# Patient Record
Sex: FEMALE | Race: WHITE | NOT HISPANIC OR LATINO | Employment: FULL TIME | URBAN - NONMETROPOLITAN AREA
[De-identification: names, ages, dates, MRNs, and addresses within clinical notes are randomized per-mention and may not be internally consistent; named-entity substitution may affect disease eponyms.]

---

## 2017-01-05 ENCOUNTER — OFFICE VISIT (OUTPATIENT)
Dept: BARIATRICS/WEIGHT MGMT | Facility: CLINIC | Age: 47
End: 2017-01-05

## 2017-01-05 ENCOUNTER — NUTRITION (OUTPATIENT)
Dept: BARIATRICS/WEIGHT MGMT | Facility: HOSPITAL | Age: 47
End: 2017-01-05

## 2017-01-05 ENCOUNTER — APPOINTMENT (OUTPATIENT)
Dept: LAB | Facility: HOSPITAL | Age: 47
End: 2017-01-05

## 2017-01-05 VITALS
RESPIRATION RATE: 20 BRPM | DIASTOLIC BLOOD PRESSURE: 87 MMHG | SYSTOLIC BLOOD PRESSURE: 132 MMHG | WEIGHT: 205 LBS | HEART RATE: 100 BPM | BODY MASS INDEX: 36.32 KG/M2 | TEMPERATURE: 98 F | HEIGHT: 63 IN

## 2017-01-05 DIAGNOSIS — E66.01 MORBID OBESITY DUE TO EXCESS CALORIES (HCC): Primary | ICD-10-CM

## 2017-01-05 DIAGNOSIS — E55.9 VITAMIN D DEFICIENCY: ICD-10-CM

## 2017-01-05 LAB — 25(OH)D3 SERPL-MCNC: 17.3 NG/ML (ref 30–100)

## 2017-01-05 PROCEDURE — 36415 COLL VENOUS BLD VENIPUNCTURE: CPT | Performed by: NURSE PRACTITIONER

## 2017-01-05 PROCEDURE — 97803 MED NUTRITION INDIV SUBSEQ: CPT

## 2017-01-05 PROCEDURE — 99213 OFFICE O/P EST LOW 20 MIN: CPT | Performed by: NURSE PRACTITIONER

## 2017-01-05 PROCEDURE — 82306 VITAMIN D 25 HYDROXY: CPT | Performed by: NURSE PRACTITIONER

## 2017-01-05 NOTE — MR AVS SNAPSHOT
Alona M Roman   1/5/2017 4:00 PM   Office Visit    Dept Phone:  [phone]   Encounter #:  [id number]    Provider:  DELON Burgess   Department:  [facility] GENERAL SURGERY                Your Full Care Plan              Your Updated Medication List          This list is accurate as of: 1/5/17  3:57 PM.  Always use your most recent med list.                hydrochlorothiazide 25 MG tablet   Commonly known as:  HYDRODIURIL       lisinopril 10 MG tablet   Commonly known as:  PRINIVIL,ZESTRIL       omeprazole 20 MG capsule   Commonly known as:  priLOSEC       potassium chloride 10 MEQ CR capsule   Commonly known as:  MICRO-K       vitamin D 00243 UNITS capsule capsule   Commonly known as:  ERGOCALCIFEROL               We Performed the Following     Vitamin D 25 Hydroxy       You Were Diagnosed With        Codes Comments    Morbid obesity due to excess calories    -  Primary ICD-10-CM: E66.01  ICD-9-CM: 278.01     Body mass index 36.0-36.9, adult     ICD-10-CM: Z68.36  ICD-9-CM: V85.36     Vitamin D deficiency     ICD-10-CM: E55.9  ICD-9-CM: 268.9       Instructions    Alona MUNOZ Roman has done well this month with healthy changes. Patient's weight has not changed much.Today we discussed healthy changes in lifestyle, diet, and exercise.   They will meet with the dietician today.   Intensive behavioral therapy for obesity was done today.   Goals for this month are: continue with good diet and exercise choices.  Just finished Vitamin D script; will check level today before prescribing more.   Insurance requires until May of 2017 before submitting for surgery.   Follow up in one month for a weight recheck.        Patient Instructions History      Upcoming Appointments     Visit Type Date Time Department    OFFICE VISIT 1/5/2017  4:00 PM [facility] BAR SURG [facility]    NUTRITION COUNSELING 1/5/2017  4:00 PM  [facility] BARIATRIC    OFFICE VISIT 2/2/2017  9:00 AM [facility]    OFFICE  VISIT 3/2/2017  9:00 AM [facility]    OFFICE VISIT 2017  9:00 AM [facility]      MyChart Signup     [facility] [employer] allows you to send messages to your doctor, view your test results, renew your prescriptions, schedule appointments, and more. To sign up, go to [facility] and click on the Sign Up Now link in the New User? box. Enter your Acupera Activation Code exactly as it appears below along with the last four digits of your Social Security Number and your Date of Birth () to complete the sign-up process. If you do not sign up before the expiration date, you must request a new code.    Acupera Activation Code: [email or web address]  Expires: 2017  3:57 PM    If you have questions, you can email [email or web address] or call [phone] to talk to our Acuper[employer] staff. Remember, Acupera is NOT to be used for urgent needs. For medical emergencies, dial 911.               Other Info from Your Visit           Your Appointments     2017  4:00 PM CST   Office Visit with Farnaz Hahn APRZUHAIR   [facility] GENERAL SURGERY (--)    [address] [place] [address] [address]  [place] [address]   [phone]           Arrive 15 minutes prior to appointment.            2017  4:00 PM CST   Nutrition Counseling with [facility] BARIATRIC RESOURCE   [facility] BARIATRIC ([place])    [address] [facility] [address] [place] [address]   [phone]            2017  9:00 AM CST   Office Visit with DELNO Burgess   [facility] GENERAL SURGERY (--)    [address]  [place] [address]   [phone]           Arrive 15 minutes prior to appointment.            Mar 02, 2017  9:00 AM CST   Office Visit with DELON Burgess   [facility] GENERAL SURGERY (--)    [address]  [place] [address]   [phone]           Arrive 15 minutes prior to appointment.            2017  9:00 AM CDT  "  Office Visit with DELON Burgess   [facility] GENERAL SURGERY (--)    [address] [place] [address] [phone]           Arrive 15 minutes prior to appointment.              Allergies     No Known Allergies      Reason for Visit     Weight Loss Ba 10 Visit     Obesity           Vital Signs     Blood Pressure Pulse Temperature Respirations Height Weight    132/87 (BP Location: Right arm, Patient Position: Sitting, Cuff Size: Adult) 100 98 °F (36.7 °C) 20 63\" (160 cm) 205 lb (93 kg)    Body Mass Index Smoking Status                36.31 kg/m2 Never Smoker          Problems and Diagnoses Noted     Body mass index 36.0-36.9, adult    Severe obesity    Vitamin D deficiency        "

## 2017-01-05 NOTE — PATIENT INSTRUCTIONS
Alona Owens has done well this month with healthy changes. Patient's weight has not changed much.Today we discussed healthy changes in lifestyle, diet, and exercise.   They will meet with the dietician today.   Intensive behavioral therapy for obesity was done today.   Goals for this month are: continue with good diet and exercise choices.  Just finished Vitamin D script; will check level today before prescribing more.   Insurance requires until May of 2017 before submitting for surgery.   Follow up in one month for a weight recheck.

## 2017-01-05 NOTE — PROGRESS NOTES
Subjective   Alona Owens is a 46 y.o. female.     History of Present Illness Alona Owens is here with morbid obesity and desires to have a laparoscopic sleeve gastrectomy for weight loss. This is the patient's 9th visit to the office. Patient has been cleared by psychologist. Patient has had EGD and was negative. She has been cleared by Dr. Sanchez. However, her insurance company is requiring her to wait until May of 2017 to be submitted for surgery clearance. Patient has been making health dietary choices and eating 5 small meals per day with protein at each. She has been taking Vitamin D 50,000 units once weekly. She recently ran out and is due for her level to be checked today.        The following portions of the patient's history were reviewed and updated as appropriate: allergies, current medications, past family history, past medical history, past social history, past surgical history and problem list.    Review of Systems   Constitutional: Negative for activity change, appetite change and fatigue.   HENT: Negative.    Eyes: Negative.    Respiratory: Negative.  Negative for apnea, cough, chest tightness and shortness of breath.    Cardiovascular: Negative.  Negative for chest pain, palpitations and leg swelling.   Gastrointestinal: Negative.  Negative for abdominal pain, anal bleeding, constipation, nausea and vomiting.   Endocrine: Negative.    Genitourinary: Negative.    Musculoskeletal: Negative.  Negative for arthralgias and back pain.   Skin: Negative.    Allergic/Immunologic: Negative.    Neurological: Negative.  Negative for seizures and syncope.   Hematological: Negative.  Does not bruise/bleed easily.   Psychiatric/Behavioral: Negative.  Negative for dysphoric mood, self-injury and sleep disturbance.       Objective   Physical Exam   Constitutional: She is oriented to person, place, and time. Vital signs are normal. She appears well-developed and well-nourished. She is cooperative. No distress.    HENT:   Head: Normocephalic and atraumatic.   Nose: Nose normal.   Mouth/Throat: Oropharynx is clear and moist. No oropharyngeal exudate or tonsillar abscesses.   Eyes: Conjunctivae, EOM and lids are normal. Pupils are equal, round, and reactive to light. Right eye exhibits no discharge. Left eye exhibits no discharge.   Neck: Trachea normal. Neck supple. No JVD present. Carotid bruit is not present. No rigidity. No tracheal deviation present. No thyromegaly present.   Cardiovascular: Normal rate, regular rhythm, S1 normal, S2 normal and normal heart sounds.    Pulmonary/Chest: Effort normal and breath sounds normal. No stridor. No respiratory distress. She has no wheezes. She has no rales.   Abdominal: Soft. Bowel sounds are normal. She exhibits no distension. There is no tenderness.   obese   Musculoskeletal: She exhibits no edema.        Right shoulder: She exhibits normal strength.   Lymphadenopathy:     She has no cervical adenopathy.   Neurological: She is alert and oriented to person, place, and time. She has normal strength. No cranial nerve deficit.   Skin: Skin is warm, dry and intact. No rash noted.   Psychiatric: She has a normal mood and affect. Her speech is normal and behavior is normal.   Alert and oriented x 3   Vitals reviewed.      Assessment/Plan   Alona was seen today for weight loss and obesity.    Diagnoses and all orders for this visit:    Morbid obesity due to excess calories  -     Vitamin D 25 Hydroxy    Body mass index 36.0-36.9, adult  -     Vitamin D 25 Hydroxy    Vitamin D deficiency  -     Vitamin D 25 Hydroxy              Alona ALEXANDER Roman has done well this month with healthy changes. Patient's weight has not changed much.Today we discussed healthy changes in lifestyle, diet, and exercise.   They will meet with the dietician today.   Intensive behavioral therapy for obesity was done today.   Goals for this month are: continue with good diet and exercise choices.  Just finished  Vitamin D script; will check level today before prescribing more.   Insurance requires until May of 2017 before submitting for surgery.   Follow up in one month for a weight recheck.

## 2017-01-05 NOTE — PROGRESS NOTES
"Nutrition Bariatric/M[facility] Note     Visit   BA# 7+     Anthropometrics   Height: 63\"  Weight: 205#  BMI: 36  No weight change    Nutrition Recall  24 Hour recall:  (B) protein shake (L) meat, salad (D) meat, vegetables  (hs) beef jerky or nuts  Eating 4 meals daily   Making healthier choices  Limited sweet intake  Monitoring portions  Drinking between meals   Drinking greater to or equal to 64 fluid ounces  Replacing 1 meal with protein shake daily     Exercise   Walkin times per week    Habits:  None    Education    Reinforce Nutritional Needs for Surgery    Nutrition Goals   Continue diet changes    Exercise Goals  Continue current exercise routine   Increase to 15-30 minutes of walking, cycling, elliptical, swimming, chair, yoga or crossfit every day.    Coreen Wall RD, LD  2017  3:48 PM    "

## 2017-01-05 NOTE — MR AVS SNAPSHOT
Alona ALEXANDER Roman   2017 4:00 PM   Nutrition    Dept Phone:  [phone]   Encounter #:  [id number]    Provider:  [facility] BARIATRIC RESOURCE   Department:  [facility] BARIATRIC                Your Full Care Plan              Your Updated Medication List          This list is accurate as of: 17  3:57 PM.  Always use your most recent med list.                hydrochlorothiazide 25 MG tablet   Commonly known as:  HYDRODIURIL       lisinopril 10 MG tablet   Commonly known as:  PRINIVIL,ZESTRIL       omeprazole 20 MG capsule   Commonly known as:  priLOSEC       potassium chloride 10 MEQ CR capsule   Commonly known as:  MICRO-K       vitamin D 37059 UNITS capsule capsule   Commonly known as:  ERGOCALCIFEROL               Instructions     None    Patient Instructions History      Upcoming Appointments     Visit Type Date Time Department    OFFICE VISIT 2017  4:00 PM [facility] BAR SURG P[facility]    NUTRITION COUNSELING 2017  4:00 PM  [facility] BARIATRIC    OFFICE VISIT 2017  9:00 AM [facility]    OFFICE VISIT 3/2/2017  9:00 AM [facility]G [facility]    OFFICE VISIT 2017  9:00 AM [facility]G [facility]      MyChart Signup     [facility] Cell[employer] allows you to send messages to your doctor, view your test results, renew your prescriptions, schedule appointments, and more. To sign up, go to [employer] and click on the Sign Up Now link in the New User? box. Enter your Cell[employer] Activation Code exactly as it appears below along with the last four digits of your Social Security Number and your Date of Birth () to complete the sign-up process. If you do not sign up before the expiration date, you must request a new code.    Cell[employer]ore Activation Code: [email or web address]  Expires: 2017  3:57 PM    If you have questions, you can email [email or web address] or call [phone] to talk to our Cellmem[employer] staff. Remember, Cell[employer]ore is NOT to be used for urgent needs. For medical  emergencies, dial 911.               Other Info from Your Visit           Your Appointments     Jan 05, 2017  4:00 PM CST   Office Visit with DELON Burgess   [facility] GENERAL SURGERY (--)    [address] [place] [address] [address]  [place] [address]   [phone]           Arrive 15 minutes prior to appointment.            Jan 05, 2017  4:00 PM CST   Nutrition Counseling with hospitals BARIATRIC RESOURCE   [facility] BARIATRIC ([place])    [address] [place] [address] [place] [id number] [phone]            Feb 02, 2017  9:00 AM CST   Office Visit with DELON Burgess   [facility] GENERAL SURGERY (--)    [address] [place] [address] [address]  [facility] [address]   [phone]           Arrive 15 minutes prior to appointment.            Mar 02, 2017  9:00 AM CST   Office Visit with DELON Burgess   [facility] GENERAL SURGERY (--)    [address] [place] [address] [address]  [facility] [address]   [phone]           Arrive 15 minutes prior to appointment.            Apr 06, 2017  9:00 AM CDT   Office Visit with DELON Burgess   [facility] GENERAL SURGERY (--)    [address] [place] [address] [address]  [place] [address]   [phone]           Arrive 15 minutes prior to appointment.              Allergies     No Known Allergies      Vital Signs     Smoking Status                   Never Smoker

## 2017-01-06 DIAGNOSIS — E55.9 VITAMIN D DEFICIENCY: Primary | ICD-10-CM

## 2017-01-06 RX ORDER — ERGOCALCIFEROL 1.25 MG/1
50000 CAPSULE ORAL 2 TIMES WEEKLY
Qty: 8 CAPSULE | Refills: 3 | Status: SHIPPED | OUTPATIENT
Start: 2017-01-09 | End: 2017-06-09 | Stop reason: HOSPADM

## 2017-01-09 ENCOUNTER — TELEPHONE (OUTPATIENT)
Dept: BARIATRICS/WEIGHT MGMT | Facility: HOSPITAL | Age: 47
End: 2017-01-09

## 2017-01-09 NOTE — TELEPHONE ENCOUNTER
----- Message from DELON Burgess sent at 1/6/2017  7:53 AM CST -----  Her Vitamin D level is actually lower now than before. I have sent script in for her to take Vitamin D 50,000 units TWICE weekly for next 3 months and then we will recheck level. Please, call and let her know. Thanks!      Called left a message for patient to return a call to our office.     Patient notified of her labs & prescription sent in to the pharmacy

## 2017-01-27 ENCOUNTER — RESULTS ENCOUNTER (OUTPATIENT)
Dept: BARIATRICS/WEIGHT MGMT | Facility: CLINIC | Age: 47
End: 2017-01-27

## 2017-01-27 DIAGNOSIS — E66.01 MORBID OBESITY DUE TO EXCESS CALORIES (HCC): ICD-10-CM

## 2017-02-02 ENCOUNTER — OFFICE VISIT (OUTPATIENT)
Dept: BARIATRICS/WEIGHT MGMT | Facility: CLINIC | Age: 47
End: 2017-02-02

## 2017-02-02 ENCOUNTER — NUTRITION (OUTPATIENT)
Dept: BARIATRICS/WEIGHT MGMT | Facility: HOSPITAL | Age: 47
End: 2017-02-02

## 2017-02-02 VITALS
OXYGEN SATURATION: 98 % | DIASTOLIC BLOOD PRESSURE: 91 MMHG | TEMPERATURE: 97.9 F | RESPIRATION RATE: 18 BRPM | WEIGHT: 206.2 LBS | SYSTOLIC BLOOD PRESSURE: 140 MMHG | BODY MASS INDEX: 36.54 KG/M2 | HEART RATE: 96 BPM | HEIGHT: 63 IN

## 2017-02-02 DIAGNOSIS — E66.01 MORBID OBESITY DUE TO EXCESS CALORIES (HCC): Primary | ICD-10-CM

## 2017-02-02 DIAGNOSIS — E55.9 VITAMIN D DEFICIENCY: ICD-10-CM

## 2017-02-02 DIAGNOSIS — Z01.818 PRE-OP EXAM: ICD-10-CM

## 2017-02-02 PROCEDURE — 97803 MED NUTRITION INDIV SUBSEQ: CPT

## 2017-02-02 PROCEDURE — 99213 OFFICE O/P EST LOW 20 MIN: CPT | Performed by: NURSE PRACTITIONER

## 2017-02-02 NOTE — PROGRESS NOTES
"Nutrition Bariatric/MWL Note     Visit   BA#  10    Anthropometrics   Height: 63\"  Weight: 206#  BMI: 36.5  No weight change    Nutrition Recall  24 Hour recall:  (B) protein shake (am) fruit (L) salad w/ham, turkey or salami (D) meat, vegetables (hs) protein shake or raw vegetables  Eating 5 meals daily   Protein lacking a tmid-morning and evening meal  Meeting protein daily goal  Making healthier choices  Limited sweet intake  Monitoring portions   Drinking between meals   Drinking greater to or equal to 64 fluid ounces  Replacing 1-2 meal with protein shake daily     Exercise   Walking on treadmill: 20 minutes every day    Habits:  None    Education    Reinforce Nutritional Needs for Surgery    Nutrition Goals   Continue diet changes  Include protein at all 5 meals  Eat protein first at meals    Exercise Goals  Continue current exercise routine   Increase to 30 minutes of walking, cycling, elliptical, swimming, chair, yoga or crossfit daily    Coreen Wall RD, LD  02/02/2017  9:17 AM    "

## 2017-02-02 NOTE — PROGRESS NOTES
Subjective   Alona Owens is a 46 y.o. female.     History of Present Illness Alona Owens is here with morbid obesity and desires to have a laparoscopic sleeve gastrectomy for weight loss. This is the patient's 10th visit to the office. Patient has been cleared by psychologist. Patient has had EGD and was negative. She has been cleared by Dr. Sanchez. However, her insurance company is requiring her to wait until May of 2017 to be submitted for surgery clearance. Patient has been making health dietary choices and eating 5 small meals per day with protein at each. She has been taking Vitamin D 50,000 units twice weekly due to level being low at last visit. Will recheck that level in April. She has been walking 20 minutes on the treadmill every day. She is drinking only water.     The following portions of the patient's history were reviewed and updated as appropriate: allergies, current medications, past family history, past medical history, past social history, past surgical history and problem list.    Review of Systems   Constitutional: Negative for activity change, appetite change and fatigue.   HENT: Positive for sinus pressure.    Eyes: Negative.    Respiratory: Negative.  Negative for apnea, cough, chest tightness and shortness of breath.    Cardiovascular: Negative.  Negative for chest pain, palpitations and leg swelling.   Gastrointestinal: Negative.  Negative for abdominal pain, anal bleeding, constipation, nausea and vomiting.   Endocrine: Negative.    Genitourinary: Negative.    Musculoskeletal: Negative.  Negative for arthralgias and back pain.   Skin: Negative.    Allergic/Immunologic: Negative.    Neurological: Positive for headaches. Negative for seizures and syncope.   Hematological: Negative.  Does not bruise/bleed easily.   Psychiatric/Behavioral: Negative.  Negative for dysphoric mood, self-injury and sleep disturbance.       Objective   Physical Exam   Constitutional: She is oriented to  person, place, and time. Vital signs are normal. She appears well-developed and well-nourished. She is cooperative. No distress.   HENT:   Head: Normocephalic and atraumatic.   Nose: Nose normal.   Mouth/Throat: Oropharynx is clear and moist. No oropharyngeal exudate or tonsillar abscesses.   Eyes: Conjunctivae, EOM and lids are normal. Pupils are equal, round, and reactive to light. Right eye exhibits no discharge. Left eye exhibits no discharge.   Neck: Trachea normal. Neck supple. No JVD present. Carotid bruit is not present. No rigidity. No tracheal deviation present. No thyromegaly present.   Cardiovascular: Normal rate, regular rhythm, S1 normal, S2 normal and normal heart sounds.    Pulmonary/Chest: Effort normal and breath sounds normal. No stridor. No respiratory distress. She has no wheezes. She has no rales.   Abdominal: Soft. Bowel sounds are normal. She exhibits no distension. There is no tenderness.   obese   Musculoskeletal: She exhibits no edema.        Right shoulder: She exhibits normal strength.   Lymphadenopathy:     She has no cervical adenopathy.   Neurological: She is alert and oriented to person, place, and time. She has normal strength. No cranial nerve deficit.   Skin: Skin is warm, dry and intact. No rash noted.   Psychiatric: She has a normal mood and affect. Her speech is normal and behavior is normal.   Alert and oriented x 3   Vitals reviewed.      Assessment/Plan   Alona was seen today for obesity, weight loss and nutrition counseling.    Diagnoses and all orders for this visit:    Morbid obesity due to excess calories  -     Ambulatory Referral to Cardiology    Body mass index 36.0-36.9, adult  -     Ambulatory Referral to Cardiology    Vitamin D deficiency    Pre-op exam  -     Ambulatory Referral to Cardiology          Alona Owens has done well this month with healthy changes. Patient has gained 1 pound. Today we discussed healthy changes in lifestyle, diet, and exercise.    They will meet with the dietician today.   Intensive behavioral therapy for obesity was done today.   Goals for this month are: 3 meals per day with protein at each; eat protein first, use smaller plate; exercise as advised per dietician.  Cardiac referral was made today for cardiac clearance prior to surgery.   Insurance will not allow surgery submission until May.   Keep taking Vitamin D supplement. Will recheck level in April.   Follow up in one month for a weight recheck.

## 2017-02-02 NOTE — PATIENT INSTRUCTIONS
Alona Owens has done well this month with healthy changes. Patient has gained 1 pound. Today we discussed healthy changes in lifestyle, diet, and exercise.   They will meet with the dietician today.   Intensive behavioral therapy for obesity was done today.   Goals for this month are: 3 meals per day with protein at each; eat protein first, use smaller plate; exercise as advised per dietician.  Cardiac referral was made today for cardiac clearance prior to surgery.   Insurance will not allow surgery submission until May.   Keep taking Vitamin D supplement. Will recheck level in April.   Follow up in one month for a weight recheck.

## 2017-02-20 ENCOUNTER — OFFICE VISIT (OUTPATIENT)
Dept: CARDIOLOGY | Facility: CLINIC | Age: 47
End: 2017-02-20

## 2017-02-20 VITALS
HEIGHT: 63 IN | DIASTOLIC BLOOD PRESSURE: 82 MMHG | WEIGHT: 210 LBS | HEART RATE: 75 BPM | SYSTOLIC BLOOD PRESSURE: 132 MMHG | BODY MASS INDEX: 37.21 KG/M2

## 2017-02-20 DIAGNOSIS — E66.01 MORBID OBESITY DUE TO EXCESS CALORIES (HCC): Primary | ICD-10-CM

## 2017-02-20 DIAGNOSIS — Z01.818 PRE-OP EXAM: ICD-10-CM

## 2017-02-20 DIAGNOSIS — R06.09 DYSPNEA ON EXERTION: ICD-10-CM

## 2017-02-20 DIAGNOSIS — I10 ESSENTIAL HYPERTENSION: ICD-10-CM

## 2017-02-20 DIAGNOSIS — E55.9 VITAMIN D DEFICIENCY: ICD-10-CM

## 2017-02-20 PROCEDURE — 99204 OFFICE O/P NEW MOD 45 MIN: CPT | Performed by: INTERNAL MEDICINE

## 2017-02-20 PROCEDURE — 93000 ELECTROCARDIOGRAM COMPLETE: CPT | Performed by: INTERNAL MEDICINE

## 2017-02-20 NOTE — PROGRESS NOTES
Alona Owens  [id number]  1970  47 y.o.  female    Referring Provider: Ludwig Hayes MD    Reason for Follow-up Visit: PREOP CV EXAM FRO GASTRIC SLEEVE    Overall doing well  Denies any chest pain  No excessive shortness of breath  Compliant with medications    History of present illness:  Alona Owens is a 47 y.o. yo female with history of HTN who presents today for   Chief Complaint   Patient presents with   • Surgical Clearance     GASTRIC SLEEVE - DR HUDSON - NOT YET SCHEDULED   • Hypertension     PT STATES BP IS USUALLY UNDER CONTROL   .    History  Past Medical History   Diagnosis Date   • Fatty liver    • Granuloma of skin determined by biopsy      Pyrogenic on left arch removed by Dr Freddy Mao on    • Heartburn      mild-she uses PPI's as needed     • Hyperlipidemia    • Hypertension    • Migraines      Occasionally    • Obesity    ,   Past Surgical History   Procedure Laterality Date   •  section     • Abdominoplasty     • Breast implant surgery     • Cholecystectomy     • Upper gastrointestinal endoscopy  2016     Dr Castaneda at [facility]    ,   Family History   Problem Relation Age of Onset   • Obesity Mother    • Hypertension Mother    • No Known Problems Father    ,   Social History   Substance Use Topics   • Smoking status: Never Smoker   • Smokeless tobacco: None   • Alcohol use 0.6 oz/week     1 Glasses of wine per week      Comment: OCC   ,     Medications  Current Outpatient Prescriptions   Medication Sig Dispense Refill   • ergocalciferol (ERGOCALCIFEROL) 40150 UNITS capsule Take 1 capsule by mouth 2 (Two) Times a Week. 8 capsule 3   • hydrochlorothiazide (HYDRODIURIL) 25 MG tablet Take 25 mg by mouth daily.     • lisinopril (PRINIVIL,ZESTRIL) 10 MG tablet Take 10 mg by mouth daily.     • omeprazole (PriLOSEC) 20 MG capsule Take 20 mg by mouth daily.     • potassium chloride (MICRO-K) 10 MEQ CR capsule Take 10 mEq by mouth 2 (Two)  "Times a Day.       No current facility-administered medications for this visit.        Allergies:  Review of patient's allergies indicates no known allergies.    Review of Systems  Review of Systems   Constitution: Negative.   HENT: Negative.    Eyes: Negative.    Cardiovascular: Positive for dyspnea on exertion. Negative for chest pain, claudication, cyanosis, irregular heartbeat, leg swelling, near-syncope, orthopnea, palpitations, paroxysmal nocturnal dyspnea and syncope.   Respiratory: Negative.    Endocrine: Negative.    Hematologic/Lymphatic: Negative.    Skin: Negative.    Gastrointestinal: Negative for anorexia.   Genitourinary: Negative.    Neurological: Negative.    Psychiatric/Behavioral: Negative.        Objective     Physical Exam:  Visit Vitals   • /82   • Pulse 75   • Ht 63\" (160 cm)   • Wt 210 lb (95.3 kg)   • BMI 37.2 kg/m2     Physical Exam   Constitutional: She appears well-developed.   HENT:   Head: Normocephalic.   Neck: Normal carotid pulses and no JVD present. No tracheal tenderness present. Carotid bruit is not present. No tracheal deviation and no edema present.   Cardiovascular: Regular rhythm, normal heart sounds and normal pulses.    Pulmonary/Chest: Effort normal. No stridor.   Abdominal: Soft.   Neurological: She is alert. She has normal strength. No cranial nerve deficit or sensory deficit.   Skin: Skin is warm.   Psychiatric: She has a normal mood and affect. Her speech is normal and behavior is normal.       Results Review:       ECG 12 Lead  Date/Time: 2/20/2017 9:31 AM  Performed by: RUIZ KING  Authorized by: RUIZ KING   Comparison: not compared with previous ECG   Rhythm: sinus rhythm  Rate: normal  T flattening: V4, V5 and V6  QRS axis: normal  Clinical impression: normal ECG and low voltage            Assessment/Plan   Patient Active Problem List   Diagnosis   • Body mass index 36.0-36.9, adult   • Vitamin D deficiency   • Pre-op exam   • Essential hypertension "       No palpitations. No significant pedal edema. Compliant with medications and diet. Latest labs and medications reviewed.    Plan:  Echo and treadmill stress test  Close follow up with you as scheduled.  Intensive factor modifications.  See order list.    Counseled regarding disease appropriate diet, fluid, caffeine, stimulants and sodium intake as well as importance of compliance to diet, exercise and regular follow up.  Avoid NSAIDS and COX2 inhibitors. Use Acetaminophen PRN.    Return in about 4 weeks (around 3/20/2017).

## 2017-02-24 ENCOUNTER — RESULTS ENCOUNTER (OUTPATIENT)
Dept: BARIATRICS/WEIGHT MGMT | Facility: CLINIC | Age: 47
End: 2017-02-24

## 2017-02-24 DIAGNOSIS — E66.01 MORBID OBESITY DUE TO EXCESS CALORIES (HCC): ICD-10-CM

## 2017-03-02 ENCOUNTER — OFFICE VISIT (OUTPATIENT)
Dept: BARIATRICS/WEIGHT MGMT | Facility: CLINIC | Age: 47
End: 2017-03-02

## 2017-03-02 ENCOUNTER — APPOINTMENT (OUTPATIENT)
Dept: BARIATRICS/WEIGHT MGMT | Facility: HOSPITAL | Age: 47
End: 2017-03-02

## 2017-03-02 VITALS
BODY MASS INDEX: 36.75 KG/M2 | HEART RATE: 79 BPM | HEIGHT: 63 IN | RESPIRATION RATE: 20 BRPM | DIASTOLIC BLOOD PRESSURE: 87 MMHG | OXYGEN SATURATION: 98 % | WEIGHT: 207.4 LBS | SYSTOLIC BLOOD PRESSURE: 136 MMHG | TEMPERATURE: 99.3 F

## 2017-03-02 DIAGNOSIS — E55.9 VITAMIN D DEFICIENCY: ICD-10-CM

## 2017-03-02 DIAGNOSIS — E66.01 MORBID OBESITY, UNSPECIFIED OBESITY TYPE (HCC): Primary | ICD-10-CM

## 2017-03-02 PROCEDURE — 99213 OFFICE O/P EST LOW 20 MIN: CPT | Performed by: NURSE PRACTITIONER

## 2017-03-02 NOTE — PATIENT INSTRUCTIONS
Alona Owens has done well this month with healthy changes. Patient has gained 1 pound. Today we discussed healthy changes in lifestyle, diet, and exercise.   Intensive behavioral therapy for obesity was done today.   Goals for this month are: 5 small meals per day with protein at each; eat protein first, use smaller plate; exercise as advised.  Cardiac referral was made today for cardiac clearance prior to surgery. Testing and final clearance pending.   Insurance will not allow surgery submission until May.   Keep taking Vitamin D supplement. Will recheck level in April.   Follow up in one month for a weight recheck.

## 2017-03-02 NOTE — PROGRESS NOTES
Subjective   Alona Owens is a 47 y.o. female.     History of Present Illness Alona Owens is here with morbid obesity and desires to have a laparoscopic sleeve gastrectomy for weight loss. This is the patient's 11th visit to the office. Patient has been cleared by psychologist. Patient has had EGD and was negative. She has been cleared by Dr. Sanchez. However, her insurance company is requiring her to wait until May of 2017 to be submitted for surgery clearance. Patient has been making health dietary choices and eating 5 small meals per day with protein at each. She has been taking Vitamin D 50,000 units twice weekly due to level being low at last visit. Will recheck that level in April. She has been walking 20 minutes on the treadmill every day. She is drinking only water. She has met with cardiologist Dr. Yoder and he has ordered some cardiac testing prior to providing cardiac clearance.    The following portions of the patient's history were reviewed and updated as appropriate: allergies, current medications, past family history, past medical history, past social history, past surgical history and problem list.    Review of Systems   Constitutional: Negative for activity change, appetite change and fatigue.   HENT: Negative.    Eyes: Negative.    Respiratory: Negative.  Negative for apnea, cough, chest tightness and shortness of breath.    Cardiovascular: Negative.  Negative for chest pain, palpitations and leg swelling.   Gastrointestinal: Negative.  Negative for abdominal pain, anal bleeding, constipation, nausea and vomiting.   Endocrine: Negative.    Genitourinary: Negative.    Musculoskeletal: Negative.  Negative for arthralgias and back pain.   Skin: Negative.    Allergic/Immunologic: Negative.    Neurological: Negative.  Negative for seizures and syncope.   Hematological: Negative.  Does not bruise/bleed easily.   Psychiatric/Behavioral: Negative.  Negative for dysphoric mood, self-injury and sleep  disturbance.       Objective   Physical Exam   Constitutional: She is oriented to person, place, and time. Vital signs are normal. She appears well-developed and well-nourished. She is cooperative. No distress.   HENT:   Head: Normocephalic and atraumatic.   Nose: Nose normal.   Mouth/Throat: Oropharynx is clear and moist. No oropharyngeal exudate or tonsillar abscesses.   Eyes: Conjunctivae, EOM and lids are normal. Pupils are equal, round, and reactive to light. Right eye exhibits no discharge. Left eye exhibits no discharge.   Neck: Trachea normal. Neck supple. No JVD present. Carotid bruit is not present. No rigidity. No tracheal deviation present. No thyromegaly present.   Cardiovascular: Normal rate, regular rhythm, S1 normal, S2 normal and normal heart sounds.    Pulmonary/Chest: Effort normal and breath sounds normal. No stridor. No respiratory distress. She has no wheezes. She has no rales.   Abdominal: Soft. Bowel sounds are normal. She exhibits no distension. There is no tenderness.   obese   Musculoskeletal: She exhibits no edema.        Right shoulder: She exhibits normal strength.   Lymphadenopathy:     She has no cervical adenopathy.   Neurological: She is alert and oriented to person, place, and time. She has normal strength. No cranial nerve deficit.   Skin: Skin is warm, dry and intact. No rash noted.   Psychiatric: She has a normal mood and affect. Her speech is normal and behavior is normal.   Alert and oriented x 3   Vitals reviewed.      Assessment/Plan   Alona was seen today for obesity and weight loss.    Diagnoses and all orders for this visit:    Morbid obesity, unspecified obesity type    Body mass index 36.0-36.9, adult    Vitamin D deficiency          Alona Owens has done well this month with healthy changes. Patient has gained 1 pound. Today we discussed healthy changes in lifestyle, diet, and exercise.   Intensive behavioral therapy for obesity was done today.   Goals for this  month are: 5 small meals per day with protein at each; eat protein first, use smaller plate; exercise as advised.  Cardiac referral was made today for cardiac clearance prior to surgery. Testing and final clearance pending.   Insurance will not allow surgery submission until May.   Keep taking Vitamin D supplement. Will recheck level in April.   Follow up in one month for a weight recheck.

## 2017-03-10 ENCOUNTER — HOSPITAL ENCOUNTER (OUTPATIENT)
Dept: CARDIOLOGY | Facility: HOSPITAL | Age: 47
Discharge: HOME | End: 2017-03-10
Attending: INTERNAL MEDICINE | Admitting: INTERNAL MEDICINE

## 2017-03-10 ENCOUNTER — HOSPITAL ENCOUNTER (OUTPATIENT)
Dept: CARDIOLOGY | Facility: HOSPITAL | Age: 47
Discharge: HOME | End: 2017-03-10
Attending: INTERNAL MEDICINE

## 2017-03-10 VITALS
WEIGHT: 206 LBS | SYSTOLIC BLOOD PRESSURE: 140 MMHG | HEIGHT: 63 IN | DIASTOLIC BLOOD PRESSURE: 97 MMHG | HEART RATE: 76 BPM | BODY MASS INDEX: 36.5 KG/M2

## 2017-03-10 VITALS
SYSTOLIC BLOOD PRESSURE: 130 MMHG | WEIGHT: 208 LBS | BODY MASS INDEX: 36.86 KG/M2 | DIASTOLIC BLOOD PRESSURE: 82 MMHG | HEIGHT: 63 IN

## 2017-03-10 DIAGNOSIS — R06.09 DYSPNEA ON EXERTION: ICD-10-CM

## 2017-03-10 LAB
BH CV ECHO MEAS - AO MAX PG (FULL): 4.2 MMHG
BH CV ECHO MEAS - AO MAX PG: 9.5 MMHG
BH CV ECHO MEAS - AO MEAN PG (FULL): 2 MMHG
BH CV ECHO MEAS - AO MEAN PG: 5 MMHG
BH CV ECHO MEAS - AO ROOT AREA (BSA CORRECTED): 1.4
BH CV ECHO MEAS - AO ROOT AREA: 6.2 CM^2
BH CV ECHO MEAS - AO ROOT DIAM: 2.8 CM
BH CV ECHO MEAS - AO V2 MAX: 154 CM/SEC
BH CV ECHO MEAS - AO V2 MEAN: 105 CM/SEC
BH CV ECHO MEAS - AO V2 VTI: 33.6 CM
BH CV ECHO MEAS - AVA(I,A): 2.4 CM^2
BH CV ECHO MEAS - AVA(I,D): 2.4 CM^2
BH CV ECHO MEAS - AVA(V,A): 2.3 CM^2
BH CV ECHO MEAS - AVA(V,D): 2.3 CM^2
BH CV ECHO MEAS - BSA(HAYCOCK): 2.1 M^2
BH CV ECHO MEAS - BSA: 2 M^2
BH CV ECHO MEAS - BZI_BMI: 36.8 KILOGRAMS/M^2
BH CV ECHO MEAS - BZI_METRIC_HEIGHT: 160 CM
BH CV ECHO MEAS - BZI_METRIC_WEIGHT: 94.3 KG
BH CV ECHO MEAS - CONTRAST EF 4CH: 66.1 ML/M^2
BH CV ECHO MEAS - EDV(CUBED): 86.4 ML
BH CV ECHO MEAS - EDV(MOD-SP4): 80.3 ML
BH CV ECHO MEAS - EDV(TEICH): 88.6 ML
BH CV ECHO MEAS - EF(CUBED): 75.9 %
BH CV ECHO MEAS - EF(MOD-SP4): 66.1 %
BH CV ECHO MEAS - EF(TEICH): 68.1 %
BH CV ECHO MEAS - ESV(CUBED): 20.8 ML
BH CV ECHO MEAS - ESV(MOD-SP4): 27.2 ML
BH CV ECHO MEAS - ESV(TEICH): 28.3 ML
BH CV ECHO MEAS - FS: 37.8 %
BH CV ECHO MEAS - IVS/LVPW: 1
BH CV ECHO MEAS - IVSD: 1.1 CM
BH CV ECHO MEAS - LA DIMENSION: 3.4 CM
BH CV ECHO MEAS - LA/AO: 1.2
BH CV ECHO MEAS - LAT PEAK E' VEL: 9 CM/SEC
BH CV ECHO MEAS - LV DIASTOLIC VOL/BSA (35-75): 40.8 ML/M^2
BH CV ECHO MEAS - LV MASS(C)D: 166.9 GRAMS
BH CV ECHO MEAS - LV MASS(C)DI: 84.9 GRAMS/M^2
BH CV ECHO MEAS - LV MAX PG: 5.3 MMHG
BH CV ECHO MEAS - LV MEAN PG: 3 MMHG
BH CV ECHO MEAS - LV SYSTOLIC VOL/BSA (12-30): 13.8 ML/M^2
BH CV ECHO MEAS - LV V1 MAX: 115 CM/SEC
BH CV ECHO MEAS - LV V1 MEAN: 76.7 CM/SEC
BH CV ECHO MEAS - LV V1 VTI: 26.2 CM
BH CV ECHO MEAS - LVIDD: 4.4 CM
BH CV ECHO MEAS - LVIDS: 2.8 CM
BH CV ECHO MEAS - LVLD AP4: 7.4 CM
BH CV ECHO MEAS - LVLS AP4: 5.8 CM
BH CV ECHO MEAS - LVOT AREA (M): 3.1 CM^2
BH CV ECHO MEAS - LVOT AREA: 3.1 CM^2
BH CV ECHO MEAS - LVOT DIAM: 2 CM
BH CV ECHO MEAS - LVPWD: 1.1 CM
BH CV ECHO MEAS - MED PEAK E' VEL: 6.31 CM/SEC
BH CV ECHO MEAS - MV A MAX VEL: 82.7 CM/SEC
BH CV ECHO MEAS - MV DEC TIME: 0.17 SEC
BH CV ECHO MEAS - MV E MAX VEL: 87.5 CM/SEC
BH CV ECHO MEAS - MV E/A: 1.1
BH CV ECHO MEAS - RAP SYSTOLE: 10 MMHG
BH CV ECHO MEAS - RVSP: 27.8 MMHG
BH CV ECHO MEAS - SI(AO): 105.2 ML/M^2
BH CV ECHO MEAS - SI(CUBED): 33.3 ML/M^2
BH CV ECHO MEAS - SI(LVOT): 41.9 ML/M^2
BH CV ECHO MEAS - SI(MOD-SP4): 27 ML/M^2
BH CV ECHO MEAS - SI(TEICH): 30.7 ML/M^2
BH CV ECHO MEAS - SV(AO): 206.9 ML
BH CV ECHO MEAS - SV(CUBED): 65.6 ML
BH CV ECHO MEAS - SV(LVOT): 82.3 ML
BH CV ECHO MEAS - SV(MOD-SP4): 53.1 ML
BH CV ECHO MEAS - SV(TEICH): 60.4 ML
BH CV ECHO MEAS - TR MAX VEL: 211 CM/SEC
LEFT ATRIUM VOLUME INDEX: 20.5 ML/M2
LEFT ATRIUM VOLUME: 40.4 CM3
LV EF 2D ECHO EST: 60 %

## 2017-03-10 PROCEDURE — 93306 TTE W/DOPPLER COMPLETE: CPT

## 2017-03-10 PROCEDURE — 93306 TTE W/DOPPLER COMPLETE: CPT | Performed by: INTERNAL MEDICINE

## 2017-03-10 PROCEDURE — 93018 CV STRESS TEST I&R ONLY: CPT | Performed by: INTERNAL MEDICINE

## 2017-03-10 PROCEDURE — 93017 CV STRESS TEST TRACING ONLY: CPT

## 2017-03-12 LAB
BH CV STRESS BP STAGE 1: NORMAL
BH CV STRESS BP STAGE 2: NORMAL
BH CV STRESS BP STAGE 3: NORMAL
BH CV STRESS DURATION MIN STAGE 1: 3
BH CV STRESS DURATION MIN STAGE 2: 3
BH CV STRESS DURATION MIN STAGE 3: 3
BH CV STRESS DURATION SEC STAGE 1: 0
BH CV STRESS DURATION SEC STAGE 2: 0
BH CV STRESS DURATION SEC STAGE 3: 0
BH CV STRESS GRADE STAGE 1: 10
BH CV STRESS GRADE STAGE 2: 12
BH CV STRESS GRADE STAGE 3: 14
BH CV STRESS HR STAGE 1: 109
BH CV STRESS HR STAGE 2: 125
BH CV STRESS HR STAGE 3: 155
BH CV STRESS METS STAGE 1: 5
BH CV STRESS METS STAGE 2: 7.5
BH CV STRESS METS STAGE 3: 10
BH CV STRESS PROTOCOL 1: NORMAL
BH CV STRESS RECOVERY BP: NORMAL MMHG
BH CV STRESS RECOVERY HR: 89 BPM
BH CV STRESS SPEED STAGE 1: 1.7
BH CV STRESS SPEED STAGE 2: 2.5
BH CV STRESS SPEED STAGE 3: 3.4
BH CV STRESS STAGE 1: 1
BH CV STRESS STAGE 2: 2
BH CV STRESS STAGE 3: 3
MAXIMAL PREDICTED HEART RATE: 174 BPM
PERCENT MAX PREDICTED HR: 89.08 %
STRESS BASELINE BP: NORMAL MMHG
STRESS BASELINE HR: 72 BPM
STRESS PERCENT HR: 105 %
STRESS POST ESTIMATED WORKLOAD: 10 METS
STRESS POST EXERCISE DUR MIN: 9 MIN
STRESS POST PEAK BP: NORMAL MMHG
STRESS POST PEAK HR: 155 BPM
STRESS TARGET HR: 148 BPM

## 2017-03-24 ENCOUNTER — RESULTS ENCOUNTER (OUTPATIENT)
Dept: BARIATRICS/WEIGHT MGMT | Facility: CLINIC | Age: 47
End: 2017-03-24

## 2017-03-24 DIAGNOSIS — E66.01 MORBID OBESITY DUE TO EXCESS CALORIES (HCC): ICD-10-CM

## 2017-04-05 ENCOUNTER — OFFICE VISIT (OUTPATIENT)
Dept: CARDIOLOGY | Facility: CLINIC | Age: 47
End: 2017-04-05

## 2017-04-05 ENCOUNTER — OFFICE VISIT (OUTPATIENT)
Dept: BARIATRICS/WEIGHT MGMT | Facility: CLINIC | Age: 47
End: 2017-04-05

## 2017-04-05 ENCOUNTER — APPOINTMENT (OUTPATIENT)
Dept: BARIATRICS/WEIGHT MGMT | Facility: HOSPITAL | Age: 47
End: 2017-04-05

## 2017-04-05 ENCOUNTER — TRANSCRIBE ORDERS (OUTPATIENT)
Dept: ADMINISTRATIVE | Facility: HOSPITAL | Age: 47
End: 2017-04-05

## 2017-04-05 ENCOUNTER — APPOINTMENT (OUTPATIENT)
Dept: LAB | Facility: HOSPITAL | Age: 47
End: 2017-04-05

## 2017-04-05 ENCOUNTER — TELEPHONE (OUTPATIENT)
Dept: BARIATRICS/WEIGHT MGMT | Facility: CLINIC | Age: 47
End: 2017-04-05

## 2017-04-05 VITALS
HEART RATE: 91 BPM | BODY MASS INDEX: 36.54 KG/M2 | HEIGHT: 63 IN | RESPIRATION RATE: 20 BRPM | DIASTOLIC BLOOD PRESSURE: 90 MMHG | WEIGHT: 206.2 LBS | OXYGEN SATURATION: 99 % | SYSTOLIC BLOOD PRESSURE: 130 MMHG | TEMPERATURE: 99.1 F

## 2017-04-05 VITALS
WEIGHT: 209 LBS | HEIGHT: 63 IN | HEART RATE: 86 BPM | BODY MASS INDEX: 37.03 KG/M2 | SYSTOLIC BLOOD PRESSURE: 120 MMHG | OXYGEN SATURATION: 96 % | DIASTOLIC BLOOD PRESSURE: 88 MMHG

## 2017-04-05 DIAGNOSIS — I10 ESSENTIAL (PRIMARY) HYPERTENSION: ICD-10-CM

## 2017-04-05 DIAGNOSIS — E66.01 MORBID OBESITY, UNSPECIFIED OBESITY TYPE (HCC): Primary | ICD-10-CM

## 2017-04-05 DIAGNOSIS — E55.9 VITAMIN D DEFICIENCY: ICD-10-CM

## 2017-04-05 DIAGNOSIS — E78.00 PURE HYPERCHOLESTEROLEMIA: Primary | ICD-10-CM

## 2017-04-05 DIAGNOSIS — I10 ESSENTIAL HYPERTENSION: Primary | ICD-10-CM

## 2017-04-05 DIAGNOSIS — Z01.818 PRE-OP EXAM: ICD-10-CM

## 2017-04-05 DIAGNOSIS — E56.8 DEFICIENCY OF OTHER VITAMINS: ICD-10-CM

## 2017-04-05 LAB
25(OH)D3 SERPL-MCNC: 34.1 NG/ML (ref 30–100)
ALBUMIN SERPL-MCNC: 4.7 G/DL (ref 3.5–5)
ALBUMIN/GLOB SERPL: 1.2 G/DL (ref 1.1–2.5)
ALP SERPL-CCNC: 63 U/L (ref 24–120)
ALT SERPL W P-5'-P-CCNC: 113 U/L (ref 0–54)
ANION GAP SERPL CALCULATED.3IONS-SCNC: 15 MMOL/L (ref 4–13)
ARTICHOKE IGE QN: 167 MG/DL (ref 0–99)
AST SERPL-CCNC: 97 U/L (ref 7–45)
BILIRUB SERPL-MCNC: 1.1 MG/DL (ref 0.1–1)
BUN BLD-MCNC: 20 MG/DL (ref 5–21)
BUN/CREAT SERPL: 28.6 (ref 7–25)
CALCIUM SPEC-SCNC: 10.2 MG/DL (ref 8.4–10.4)
CHLORIDE SERPL-SCNC: 94 MMOL/L (ref 98–110)
CHOLEST SERPL-MCNC: 256 MG/DL (ref 130–200)
CO2 SERPL-SCNC: 28 MMOL/L (ref 24–31)
CREAT BLD-MCNC: 0.7 MG/DL (ref 0.5–1.4)
GFR SERPL CREATININE-BSD FRML MDRD: 90 ML/MIN/1.73
GLOBULIN UR ELPH-MCNC: 3.9 GM/DL
GLUCOSE BLD-MCNC: 110 MG/DL (ref 70–100)
HDLC SERPL-MCNC: 50 MG/DL
LDLC/HDLC SERPL: 3.57 {RATIO}
POTASSIUM BLD-SCNC: 3.4 MMOL/L (ref 3.5–5.3)
PROT SERPL-MCNC: 8.6 G/DL (ref 6.3–8.7)
SODIUM BLD-SCNC: 137 MMOL/L (ref 135–145)
TRIGL SERPL-MCNC: 137 MG/DL (ref 0–149)

## 2017-04-05 PROCEDURE — 80053 COMPREHEN METABOLIC PANEL: CPT | Performed by: PHYSICIAN ASSISTANT

## 2017-04-05 PROCEDURE — 82306 VITAMIN D 25 HYDROXY: CPT | Performed by: NURSE PRACTITIONER

## 2017-04-05 PROCEDURE — 36415 COLL VENOUS BLD VENIPUNCTURE: CPT | Performed by: NURSE PRACTITIONER

## 2017-04-05 PROCEDURE — 80061 LIPID PANEL: CPT | Performed by: PHYSICIAN ASSISTANT

## 2017-04-05 PROCEDURE — 99213 OFFICE O/P EST LOW 20 MIN: CPT | Performed by: NURSE PRACTITIONER

## 2017-04-05 PROCEDURE — 99214 OFFICE O/P EST MOD 30 MIN: CPT | Performed by: INTERNAL MEDICINE

## 2017-04-05 NOTE — TELEPHONE ENCOUNTER
----- Message from DELON Burgess sent at 4/5/2017 11:45 AM CDT -----  Her vitamin D level is now normal. She can take OTC Vitamin D3 5,000 units daily to keep at normal level. Thanks.      Called and left message informing patient of instructions.

## 2017-04-05 NOTE — PROGRESS NOTES
Alona Owens  [id number]  1970  47 y.o.  female    Referring Provider: Ludwig Hayes MD    Reason for Follow-up Visit: PREOP CV EXAM FOR GASTRIC SLEEVE    Overall doing well  Denies any chest pain  No excessive shortness of breath  Compliant with medications    History of present illness:  Alona Owens is a 47 y.o. yo female with history of HTN who presents today for   Chief Complaint   Patient presents with   • Surgical Clearance     1 mo f/u - test results - gastric sleeve   .    History  Past Medical History:   Diagnosis Date   • Fatty liver    • Granuloma of skin determined by biopsy     Pyrogenic on left arch removed by Dr Freddy Mao on    • Heartburn     mild-she uses PPI's as needed     • Hyperlipidemia    • Hypertension    • Migraines     Occasionally    • Obesity    ,   Past Surgical History:   Procedure Laterality Date   • ABDOMINOPLASTY     • BREAST IMPLANT SURGERY     •  SECTION     • CHOLECYSTECTOMY     • UPPER GASTROINTESTINAL ENDOSCOPY  2016    Dr aCstaneda at [facility]    ,   Family History   Problem Relation Age of Onset   • Obesity Mother    • Hypertension Mother    • No Known Problems Father    ,   Social History   Substance Use Topics   • Smoking status: Never Smoker   • Smokeless tobacco: None   • Alcohol use 0.6 oz/week     1 Glasses of wine per week      Comment: OCC   ,     Medications  Current Outpatient Prescriptions   Medication Sig Dispense Refill   • ergocalciferol (ERGOCALCIFEROL) 26507 UNITS capsule Take 1 capsule by mouth 2 (Two) Times a Week. 8 capsule 3   • hydrochlorothiazide (HYDRODIURIL) 25 MG tablet Take 25 mg by mouth daily.     • lisinopril (PRINIVIL,ZESTRIL) 10 MG tablet Take 10 mg by mouth daily.     • omeprazole (PriLOSEC) 20 MG capsule Take 20 mg by mouth daily.     • potassium chloride (MICRO-K) 10 MEQ CR capsule Take 10 mEq by mouth 2 (Two) Times a Day.       No current facility-administered medications for  "this visit.        Allergies:  Review of patient's allergies indicates no known allergies.    Review of Systems  Review of Systems   Constitution: Negative.   HENT: Negative.    Eyes: Negative.    Cardiovascular: Positive for dyspnea on exertion. Negative for chest pain, claudication, cyanosis, irregular heartbeat, leg swelling, near-syncope, orthopnea, palpitations, paroxysmal nocturnal dyspnea and syncope.   Respiratory: Negative.    Endocrine: Negative.    Hematologic/Lymphatic: Negative.    Skin: Negative.    Gastrointestinal: Negative for anorexia.   Genitourinary: Negative.    Neurological: Negative.    Psychiatric/Behavioral: Negative.        Objective     Physical Exam:  /88  Pulse 86  Ht 63\" (160 cm)  Wt 209 lb (94.8 kg)  SpO2 96%  BMI 37.02 kg/m2  Physical Exam   Constitutional: She appears well-developed.   HENT:   Head: Normocephalic.   Neck: Normal carotid pulses and no JVD present. No tracheal tenderness present. Carotid bruit is not present. No tracheal deviation and no edema present.   Cardiovascular: Regular rhythm, normal heart sounds and normal pulses.    Pulmonary/Chest: Effort normal. No stridor.   Abdominal: Soft.   Neurological: She is alert. She has normal strength. No cranial nerve deficit or sensory deficit.   Skin: Skin is warm.   Psychiatric: She has a normal mood and affect. Her speech is normal and behavior is normal.       Results Review:     Procedures    Assessment/Plan   Patient Active Problem List   Diagnosis   • Body mass index 36.0-36.9, adult   • Vitamin D deficiency   • Pre-op exam Dr Sanchez Gastric sleeve   • Essential hypertension       No palpitations. No significant pedal edema. Compliant with medications and diet. Latest labs and medications reviewed.  Treadmill stress test normal  LV diastolic dysfunction on echo    Plan:    Acceptable cardiovascular risk of gastric sleeve surgery  Close follow up with you as scheduled.  Intensive factor modifications.  See " order list.    Counseled regarding disease appropriate diet, fluid, caffeine, stimulants and sodium intake as well as importance of compliance to diet, exercise and regular follow up.  Avoid NSAIDS and COX2 inhibitors. Use Acetaminophen PRN.    Return in about 9 months (around 1/5/2018).

## 2017-04-05 NOTE — PROGRESS NOTES
Subjective   Alona Owens is a 47 y.o. female.     History of Present Illness Alona Owens is here with morbid obesity and desires to have a laparoscopic sleeve gastrectomy for weight loss. This is the patient's 12th visit to the office. Patient has been cleared by psychologist. Patient has had EGD and was negative. She has been cleared by Dr. Sanchez. However, her insurance company is requiring her to wait until May of 2017 to be submitted for surgery clearance. She has an appt to see Dr. Sanchez on May 2nd for possible surgery submission. Patient has been making health dietary choices and eating 4-5 small meals per day with protein at each. She is getting 65 grams of protein per day. She has been taking Vitamin D 50,000 units twice weekly and that level will be checked today. She has been walking 30 minutes on the treadmill every day. She is drinking only water. She has met with cardiologist Dr. Yoder and he has cleared her for surgery.     The following portions of the patient's history were reviewed and updated as appropriate: allergies, current medications, past family history, past medical history, past social history, past surgical history and problem list.    Review of Systems   Constitutional: Negative for activity change, appetite change and fatigue.   HENT: Negative.    Eyes: Negative.    Respiratory: Negative.  Negative for apnea, cough, chest tightness and shortness of breath.    Cardiovascular: Negative.  Negative for chest pain, palpitations and leg swelling.   Gastrointestinal: Negative.  Negative for abdominal pain, anal bleeding, constipation, nausea and vomiting.        Mild heartburn   Endocrine: Negative.    Genitourinary: Negative.    Musculoskeletal: Negative.  Negative for arthralgias and back pain.   Skin: Negative.    Allergic/Immunologic: Negative.    Neurological: Negative.  Negative for seizures and syncope.   Hematological: Negative.  Does not bruise/bleed easily.    Psychiatric/Behavioral: Negative.  Negative for dysphoric mood, self-injury and sleep disturbance.       Objective   Physical Exam   Constitutional: She is oriented to person, place, and time. Vital signs are normal. She appears well-developed and well-nourished. She is cooperative. No distress.   HENT:   Head: Normocephalic and atraumatic.   Nose: Nose normal.   Mouth/Throat: Oropharynx is clear and moist. No oropharyngeal exudate or tonsillar abscesses.   Eyes: Conjunctivae, EOM and lids are normal. Pupils are equal, round, and reactive to light. Right eye exhibits no discharge. Left eye exhibits no discharge.   Neck: Trachea normal. Neck supple. No JVD present. Carotid bruit is not present. No rigidity. No tracheal deviation present. No thyromegaly present.   Cardiovascular: Normal rate, regular rhythm, S1 normal, S2 normal and normal heart sounds.    Pulmonary/Chest: Effort normal and breath sounds normal. No stridor. No respiratory distress. She has no wheezes. She has no rales.   Abdominal: Soft. Bowel sounds are normal. She exhibits no distension. There is no tenderness.   obese   Musculoskeletal: She exhibits no edema.        Right shoulder: She exhibits normal strength.   Lymphadenopathy:     She has no cervical adenopathy.   Neurological: She is alert and oriented to person, place, and time. She has normal strength. No cranial nerve deficit.   Skin: Skin is warm, dry and intact. No rash noted.   Psychiatric: She has a normal mood and affect. Her speech is normal and behavior is normal.   Alert and oriented x 3   Vitals reviewed.      Assessment/Plan   Alona was seen today for weight loss and obesity.    Diagnoses and all orders for this visit:    Morbid obesity, unspecified obesity type  -     Vitamin D 25 Hydroxy    Body mass index 36.0-36.9, adult  -     Vitamin D 25 Hydroxy    Vitamin D deficiency  -     Vitamin D 25 Hydroxy          Alona M Roman has done well this month with healthy changes.  Patient has lost one pound. Today we discussed healthy changes in lifestyle, diet, and exercise.   Intensive behavioral therapy for obesity was done today.   Goals for this month are: 5 small meals per day with protein at each; eat protein first, use smaller plate; exercise as advised.  Will check vitamin D level today. Office will call with results.   Insurance will not allow surgery submission until May.   See Dr. Sanchez on May 2nd for sign off and surgery approval.

## 2017-04-05 NOTE — PATIENT INSTRUCTIONS
Alona Owens has done well this month with healthy changes. Patient has lost one pound. Today we discussed healthy changes in lifestyle, diet, and exercise.   Intensive behavioral therapy for obesity was done today.   Goals for this month are: 5 small meals per day with protein at each; eat protein first, use smaller plate; exercise as advised.  Will check vitamin D level today. Office will call with results.   Insurance will not allow surgery submission until May.   See Dr. Sanchez on May 2nd for sign off and surgery approval.

## 2017-04-21 ENCOUNTER — RESULTS ENCOUNTER (OUTPATIENT)
Dept: BARIATRICS/WEIGHT MGMT | Facility: CLINIC | Age: 47
End: 2017-04-21

## 2017-04-21 DIAGNOSIS — E66.01 MORBID OBESITY DUE TO EXCESS CALORIES (HCC): ICD-10-CM

## 2017-05-02 ENCOUNTER — OFFICE VISIT (OUTPATIENT)
Dept: BARIATRICS/WEIGHT MGMT | Facility: CLINIC | Age: 47
End: 2017-05-02

## 2017-05-02 VITALS
OXYGEN SATURATION: 100 % | HEIGHT: 63 IN | SYSTOLIC BLOOD PRESSURE: 139 MMHG | HEART RATE: 81 BPM | WEIGHT: 207 LBS | TEMPERATURE: 98.3 F | DIASTOLIC BLOOD PRESSURE: 88 MMHG | RESPIRATION RATE: 20 BRPM | BODY MASS INDEX: 36.68 KG/M2

## 2017-05-02 DIAGNOSIS — Z01.818 PRE-OP EXAM: ICD-10-CM

## 2017-05-02 PROCEDURE — 99213 OFFICE O/P EST LOW 20 MIN: CPT | Performed by: SURGERY

## 2017-05-02 RX ORDER — SCOLOPAMINE TRANSDERMAL SYSTEM 1 MG/1
1 PATCH, EXTENDED RELEASE TRANSDERMAL ONCE
Status: CANCELLED | OUTPATIENT
Start: 2017-05-02 | End: 2017-05-02

## 2017-05-15 RX ORDER — ERGOCALCIFEROL 1.25 MG/1
CAPSULE ORAL
Qty: 8 CAPSULE | Refills: 2 | OUTPATIENT
Start: 2017-05-15

## 2017-05-19 ENCOUNTER — APPOINTMENT (OUTPATIENT)
Dept: LAB | Facility: HOSPITAL | Age: 47
End: 2017-05-19
Attending: SURGERY

## 2017-05-19 ENCOUNTER — HOSPITAL ENCOUNTER (OUTPATIENT)
Dept: GENERAL RADIOLOGY | Facility: HOSPITAL | Age: 47
Discharge: HOME | End: 2017-05-19
Admitting: NURSE PRACTITIONER

## 2017-05-19 ENCOUNTER — TREATMENT (OUTPATIENT)
Dept: BARIATRICS/WEIGHT MGMT | Facility: CLINIC | Age: 47
End: 2017-05-19

## 2017-05-19 ENCOUNTER — RESULTS ENCOUNTER (OUTPATIENT)
Dept: BARIATRICS/WEIGHT MGMT | Facility: CLINIC | Age: 47
End: 2017-05-19

## 2017-05-19 ENCOUNTER — APPOINTMENT (OUTPATIENT)
Dept: PREADMISSION TESTING | Facility: HOSPITAL | Age: 47
End: 2017-05-19

## 2017-05-19 VITALS — BODY MASS INDEX: 37.56 KG/M2 | HEIGHT: 63 IN | WEIGHT: 212 LBS

## 2017-05-19 DIAGNOSIS — Z01.818 PRE-OP TESTING: Primary | ICD-10-CM

## 2017-05-19 DIAGNOSIS — E66.01 MORBID OBESITY DUE TO EXCESS CALORIES (HCC): ICD-10-CM

## 2017-05-19 DIAGNOSIS — Z01.818 PRE-OP TESTING: ICD-10-CM

## 2017-05-19 LAB
ALBUMIN SERPL-MCNC: 4.4 G/DL (ref 3.5–5)
ALBUMIN/GLOB SERPL: 1.2 G/DL (ref 1.1–2.5)
ALP SERPL-CCNC: 76 U/L (ref 24–120)
ALT SERPL W P-5'-P-CCNC: 109 U/L (ref 0–54)
ANION GAP SERPL CALCULATED.3IONS-SCNC: 16 MMOL/L (ref 4–13)
APTT PPP: 33.3 SECONDS (ref 24.1–34.8)
AST SERPL-CCNC: 103 U/L (ref 7–45)
BASOPHILS # BLD AUTO: 0.02 10*3/MM3 (ref 0–0.2)
BASOPHILS NFR BLD AUTO: 0.2 % (ref 0–2)
BILIRUB SERPL-MCNC: 1 MG/DL (ref 0.1–1)
BUN BLD-MCNC: 13 MG/DL (ref 5–21)
BUN/CREAT SERPL: 21.7 (ref 7–25)
CALCIUM SPEC-SCNC: 9.6 MG/DL (ref 8.4–10.4)
CHLORIDE SERPL-SCNC: 97 MMOL/L (ref 98–110)
CO2 SERPL-SCNC: 29 MMOL/L (ref 24–31)
CREAT BLD-MCNC: 0.6 MG/DL (ref 0.5–1.4)
DEPRECATED RDW RBC AUTO: 41.3 FL (ref 40–54)
EOSINOPHIL # BLD AUTO: 0.07 10*3/MM3 (ref 0–0.7)
EOSINOPHIL NFR BLD AUTO: 0.8 % (ref 0–4)
ERYTHROCYTE [DISTWIDTH] IN BLOOD BY AUTOMATED COUNT: 12.8 % (ref 12–15)
GFR SERPL CREATININE-BSD FRML MDRD: 108 ML/MIN/1.73
GLOBULIN UR ELPH-MCNC: 3.6 GM/DL
GLUCOSE BLD-MCNC: 84 MG/DL (ref 70–100)
HCT VFR BLD AUTO: 39.9 % (ref 37–47)
HGB BLD-MCNC: 14.1 G/DL (ref 12–16)
IMM GRANULOCYTES # BLD: 0.03 10*3/MM3 (ref 0–0.03)
IMM GRANULOCYTES NFR BLD: 0.3 % (ref 0–5)
INR PPP: 0.94 (ref 0.91–1.09)
LYMPHOCYTES # BLD AUTO: 2.86 10*3/MM3 (ref 0.72–4.86)
LYMPHOCYTES NFR BLD AUTO: 32.6 % (ref 15–45)
MCH RBC QN AUTO: 31.1 PG (ref 28–32)
MCHC RBC AUTO-ENTMCNC: 35.3 G/DL (ref 33–36)
MCV RBC AUTO: 87.9 FL (ref 82–98)
MONOCYTES # BLD AUTO: 0.53 10*3/MM3 (ref 0.19–1.3)
MONOCYTES NFR BLD AUTO: 6 % (ref 4–12)
NEUTROPHILS # BLD AUTO: 5.27 10*3/MM3 (ref 1.87–8.4)
NEUTROPHILS NFR BLD AUTO: 60.1 % (ref 39–78)
PLATELET # BLD AUTO: 316 10*3/MM3 (ref 130–400)
PMV BLD AUTO: 10.2 FL (ref 6–12)
POTASSIUM BLD-SCNC: 3.2 MMOL/L (ref 3.5–5.3)
PROT SERPL-MCNC: 8 G/DL (ref 6.3–8.7)
PROTHROMBIN TIME: 12.8 SECONDS (ref 11.9–14.6)
RBC # BLD AUTO: 4.54 10*6/MM3 (ref 4.2–5.4)
SODIUM BLD-SCNC: 142 MMOL/L (ref 135–145)
WBC NRBC COR # BLD: 8.78 10*3/MM3 (ref 4.8–10.8)

## 2017-05-19 PROCEDURE — 36415 COLL VENOUS BLD VENIPUNCTURE: CPT | Performed by: SURGERY

## 2017-05-19 PROCEDURE — 80053 COMPREHEN METABOLIC PANEL: CPT | Performed by: SURGERY

## 2017-05-19 PROCEDURE — 93005 ELECTROCARDIOGRAM TRACING: CPT

## 2017-05-19 PROCEDURE — 85730 THROMBOPLASTIN TIME PARTIAL: CPT | Performed by: SURGERY

## 2017-05-19 PROCEDURE — 85610 PROTHROMBIN TIME: CPT | Performed by: SURGERY

## 2017-05-19 PROCEDURE — 85025 COMPLETE CBC W/AUTO DIFF WBC: CPT | Performed by: SURGERY

## 2017-05-19 PROCEDURE — 93010 ELECTROCARDIOGRAM REPORT: CPT | Performed by: INTERNAL MEDICINE

## 2017-05-19 PROCEDURE — 71020 HC CHEST PA AND LATERAL: CPT

## 2017-05-23 ENCOUNTER — TELEPHONE (OUTPATIENT)
Dept: BARIATRICS/WEIGHT MGMT | Facility: CLINIC | Age: 47
End: 2017-05-23

## 2017-06-07 ENCOUNTER — ANESTHESIA (OUTPATIENT)
Dept: PERIOP | Facility: HOSPITAL | Age: 47
End: 2017-06-07

## 2017-06-07 ENCOUNTER — ANESTHESIA EVENT (OUTPATIENT)
Dept: PERIOP | Facility: HOSPITAL | Age: 47
End: 2017-06-07

## 2017-06-07 ENCOUNTER — HOSPITAL ENCOUNTER (INPATIENT)
Facility: HOSPITAL | Age: 47
LOS: 2 days | Discharge: HOME | End: 2017-06-09
Attending: SURGERY | Admitting: SURGERY

## 2017-06-07 DIAGNOSIS — E87.6 HYPOKALEMIA DUE TO INADEQUATE POTASSIUM INTAKE: ICD-10-CM

## 2017-06-07 DIAGNOSIS — Z01.818 PRE-OP EXAM: ICD-10-CM

## 2017-06-07 DIAGNOSIS — Z98.84 STATUS POST LAPAROSCOPIC SLEEVE GASTRECTOMY: Primary | ICD-10-CM

## 2017-06-07 LAB
ABO GROUP BLD: NORMAL
B-HCG UR QL: NEGATIVE
BLD GP AB SCN SERPL QL: NEGATIVE
RH BLD: POSITIVE

## 2017-06-07 PROCEDURE — 25010000003 CEFAZOLIN PER 500 MG: Performed by: SURGERY

## 2017-06-07 PROCEDURE — 25010000002 HYDROMORPHONE PER 4 MG: Performed by: NURSE ANESTHETIST, CERTIFIED REGISTERED

## 2017-06-07 PROCEDURE — 36415 COLL VENOUS BLD VENIPUNCTURE: CPT | Performed by: SURGERY

## 2017-06-07 PROCEDURE — 25010000002 ENOXAPARIN PER 10 MG: Performed by: SURGERY

## 2017-06-07 PROCEDURE — 25010000002 MIDAZOLAM PER 1 MG: Performed by: ANESTHESIOLOGY

## 2017-06-07 PROCEDURE — 25010000002 ONDANSETRON PER 1 MG: Performed by: ANESTHESIOLOGY

## 2017-06-07 PROCEDURE — 81025 URINE PREGNANCY TEST: CPT | Performed by: ANESTHESIOLOGY

## 2017-06-07 PROCEDURE — 25010000002 ONDANSETRON PER 1 MG: Performed by: SURGERY

## 2017-06-07 PROCEDURE — 43775 LAP SLEEVE GASTRECTOMY: CPT | Performed by: SURGERY

## 2017-06-07 PROCEDURE — 86850 RBC ANTIBODY SCREEN: CPT | Performed by: SURGERY

## 2017-06-07 PROCEDURE — 86900 BLOOD TYPING SEROLOGIC ABO: CPT | Performed by: SURGERY

## 2017-06-07 PROCEDURE — 88307 TISSUE EXAM BY PATHOLOGIST: CPT | Performed by: SURGERY

## 2017-06-07 PROCEDURE — 25010000002 PROPOFOL 10 MG/ML EMULSION: Performed by: NURSE ANESTHETIST, CERTIFIED REGISTERED

## 2017-06-07 PROCEDURE — 25010000002 SUCCINYLCHOLINE PER 20 MG: Performed by: NURSE ANESTHETIST, CERTIFIED REGISTERED

## 2017-06-07 PROCEDURE — 25010000002 MORPHINE PER 10 MG: Performed by: SURGERY

## 2017-06-07 PROCEDURE — 25010000002 PROMETHAZINE PER 50 MG: Performed by: SURGERY

## 2017-06-07 PROCEDURE — 0BQS4ZZ REPAIR LEFT DIAPHRAGM, PERCUTANEOUS ENDOSCOPIC APPROACH: ICD-10-PCS | Performed by: SURGERY

## 2017-06-07 PROCEDURE — 25010000002 DEXAMETHASONE PER 1 MG: Performed by: ANESTHESIOLOGY

## 2017-06-07 PROCEDURE — 25010000002 METOCLOPRAMIDE PER 10 MG: Performed by: SURGERY

## 2017-06-07 PROCEDURE — 0DB64Z3 EXCISION OF STOMACH, PERCUTANEOUS ENDOSCOPIC APPROACH, VERTICAL: ICD-10-PCS | Performed by: SURGERY

## 2017-06-07 PROCEDURE — 94799 UNLISTED PULMONARY SVC/PX: CPT

## 2017-06-07 PROCEDURE — 86901 BLOOD TYPING SEROLOGIC RH(D): CPT | Performed by: SURGERY

## 2017-06-07 DEVICE — MESH STPL LN SEAMGUARD FLX60 BIOABS WHT/BLU/GRN/GLD/BLK: Type: IMPLANTABLE DEVICE | Site: STOMACH | Status: FUNCTIONAL

## 2017-06-07 RX ORDER — SODIUM CHLORIDE, SODIUM LACTATE, POTASSIUM CHLORIDE, CALCIUM CHLORIDE 600; 310; 30; 20 MG/100ML; MG/100ML; MG/100ML; MG/100ML
9 INJECTION, SOLUTION INTRAVENOUS CONTINUOUS
Status: DISCONTINUED | OUTPATIENT
Start: 2017-06-07 | End: 2017-06-07 | Stop reason: HOSPADM

## 2017-06-07 RX ORDER — FAMOTIDINE 10 MG/ML
20 INJECTION, SOLUTION INTRAVENOUS
Status: DISCONTINUED | OUTPATIENT
Start: 2017-06-07 | End: 2017-06-07 | Stop reason: HOSPADM

## 2017-06-07 RX ORDER — HYDRALAZINE HYDROCHLORIDE 20 MG/ML
5 INJECTION INTRAMUSCULAR; INTRAVENOUS
Status: DISCONTINUED | OUTPATIENT
Start: 2017-06-07 | End: 2017-06-07 | Stop reason: HOSPADM

## 2017-06-07 RX ORDER — PROPOFOL 10 MG/ML
VIAL (ML) INTRAVENOUS AS NEEDED
Status: DISCONTINUED | OUTPATIENT
Start: 2017-06-07 | End: 2017-06-07 | Stop reason: SURG

## 2017-06-07 RX ORDER — MORPHINE SULFATE 1 MG/ML
INJECTION, SOLUTION INTRAVENOUS CONTINUOUS
Status: DISCONTINUED | OUTPATIENT
Start: 2017-06-07 | End: 2017-06-07 | Stop reason: SDUPTHER

## 2017-06-07 RX ORDER — LIDOCAINE HYDROCHLORIDE 40 MG/ML
SOLUTION TOPICAL AS NEEDED
Status: DISCONTINUED | OUTPATIENT
Start: 2017-06-07 | End: 2017-06-07 | Stop reason: SURG

## 2017-06-07 RX ORDER — KETOROLAC TROMETHAMINE 30 MG/ML
30 INJECTION, SOLUTION INTRAMUSCULAR; INTRAVENOUS EVERY 6 HOURS PRN
Status: DISCONTINUED | OUTPATIENT
Start: 2017-06-07 | End: 2017-06-08

## 2017-06-07 RX ORDER — ONDANSETRON 2 MG/ML
4 INJECTION INTRAMUSCULAR; INTRAVENOUS EVERY 6 HOURS
Status: DISCONTINUED | OUTPATIENT
Start: 2017-06-07 | End: 2017-06-09 | Stop reason: HOSPADM

## 2017-06-07 RX ORDER — HYDROMORPHONE HCL 110MG/55ML
PATIENT CONTROLLED ANALGESIA SYRINGE INTRAVENOUS AS NEEDED
Status: DISCONTINUED | OUTPATIENT
Start: 2017-06-07 | End: 2017-06-07 | Stop reason: SURG

## 2017-06-07 RX ORDER — SODIUM CHLORIDE 9 MG/ML
INJECTION, SOLUTION INTRAVENOUS AS NEEDED
Status: DISCONTINUED | OUTPATIENT
Start: 2017-06-07 | End: 2017-06-07 | Stop reason: HOSPADM

## 2017-06-07 RX ORDER — MEPERIDINE HYDROCHLORIDE 25 MG/ML
12.5 INJECTION INTRAMUSCULAR; INTRAVENOUS; SUBCUTANEOUS
Status: DISCONTINUED | OUTPATIENT
Start: 2017-06-07 | End: 2017-06-07 | Stop reason: HOSPADM

## 2017-06-07 RX ORDER — MORPHINE SULFATE 2 MG/ML
2 INJECTION, SOLUTION INTRAMUSCULAR; INTRAVENOUS
Status: DISCONTINUED | OUTPATIENT
Start: 2017-06-07 | End: 2017-06-07 | Stop reason: HOSPADM

## 2017-06-07 RX ORDER — DIPHENHYDRAMINE HYDROCHLORIDE 50 MG/ML
12.5 INJECTION INTRAMUSCULAR; INTRAVENOUS
Status: DISCONTINUED | OUTPATIENT
Start: 2017-06-07 | End: 2017-06-07 | Stop reason: HOSPADM

## 2017-06-07 RX ORDER — SODIUM CHLORIDE 0.9 % (FLUSH) 0.9 %
1-10 SYRINGE (ML) INJECTION AS NEEDED
Status: DISCONTINUED | OUTPATIENT
Start: 2017-06-07 | End: 2017-06-07 | Stop reason: HOSPADM

## 2017-06-07 RX ORDER — MAGNESIUM HYDROXIDE 1200 MG/15ML
LIQUID ORAL AS NEEDED
Status: DISCONTINUED | OUTPATIENT
Start: 2017-06-07 | End: 2017-06-07 | Stop reason: HOSPADM

## 2017-06-07 RX ORDER — NALOXONE HCL 0.4 MG/ML
0.4 VIAL (ML) INJECTION AS NEEDED
Status: DISCONTINUED | OUTPATIENT
Start: 2017-06-07 | End: 2017-06-07 | Stop reason: HOSPADM

## 2017-06-07 RX ORDER — LABETALOL HYDROCHLORIDE 5 MG/ML
5 INJECTION, SOLUTION INTRAVENOUS
Status: DISCONTINUED | OUTPATIENT
Start: 2017-06-07 | End: 2017-06-07 | Stop reason: HOSPADM

## 2017-06-07 RX ORDER — SODIUM CHLORIDE, SODIUM LACTATE, POTASSIUM CHLORIDE, CALCIUM CHLORIDE 600; 310; 30; 20 MG/100ML; MG/100ML; MG/100ML; MG/100ML
20 INJECTION, SOLUTION INTRAVENOUS CONTINUOUS
Status: DISCONTINUED | OUTPATIENT
Start: 2017-06-07 | End: 2017-06-07 | Stop reason: HOSPADM

## 2017-06-07 RX ORDER — LISINOPRIL 10 MG/1
10 TABLET ORAL DAILY
Status: DISCONTINUED | OUTPATIENT
Start: 2017-06-08 | End: 2017-06-09 | Stop reason: HOSPADM

## 2017-06-07 RX ORDER — DEXAMETHASONE SODIUM PHOSPHATE 4 MG/ML
4 INJECTION, SOLUTION INTRA-ARTICULAR; INTRALESIONAL; INTRAMUSCULAR; INTRAVENOUS; SOFT TISSUE ONCE AS NEEDED
Status: COMPLETED | OUTPATIENT
Start: 2017-06-07 | End: 2017-06-07

## 2017-06-07 RX ORDER — DEXTROSE MONOHYDRATE 25 G/50ML
12.5 INJECTION, SOLUTION INTRAVENOUS AS NEEDED
Status: DISCONTINUED | OUTPATIENT
Start: 2017-06-07 | End: 2017-06-07 | Stop reason: HOSPADM

## 2017-06-07 RX ORDER — ONDANSETRON 2 MG/ML
4 INJECTION INTRAMUSCULAR; INTRAVENOUS ONCE AS NEEDED
Status: COMPLETED | OUTPATIENT
Start: 2017-06-07 | End: 2017-06-07

## 2017-06-07 RX ORDER — SODIUM CHLORIDE, SODIUM LACTATE, POTASSIUM CHLORIDE, CALCIUM CHLORIDE 600; 310; 30; 20 MG/100ML; MG/100ML; MG/100ML; MG/100ML
20 INJECTION, SOLUTION INTRAVENOUS CONTINUOUS
Status: DISCONTINUED | OUTPATIENT
Start: 2017-06-07 | End: 2017-06-08

## 2017-06-07 RX ORDER — METOCLOPRAMIDE HYDROCHLORIDE 5 MG/ML
10 INJECTION INTRAMUSCULAR; INTRAVENOUS EVERY 6 HOURS PRN
Status: DISCONTINUED | OUTPATIENT
Start: 2017-06-07 | End: 2017-06-09 | Stop reason: HOSPADM

## 2017-06-07 RX ORDER — ROCURONIUM BROMIDE 10 MG/ML
INJECTION, SOLUTION INTRAVENOUS AS NEEDED
Status: DISCONTINUED | OUTPATIENT
Start: 2017-06-07 | End: 2017-06-07 | Stop reason: SURG

## 2017-06-07 RX ORDER — SODIUM CHLORIDE 0.9 % (FLUSH) 0.9 %
1-10 SYRINGE (ML) INJECTION AS NEEDED
Status: DISCONTINUED | OUTPATIENT
Start: 2017-06-07 | End: 2017-06-09 | Stop reason: HOSPADM

## 2017-06-07 RX ORDER — NALOXONE HCL 0.4 MG/ML
0.1 VIAL (ML) INJECTION
Status: DISCONTINUED | OUTPATIENT
Start: 2017-06-07 | End: 2017-06-09 | Stop reason: HOSPADM

## 2017-06-07 RX ORDER — FENTANYL CITRATE 50 UG/ML
25 INJECTION, SOLUTION INTRAMUSCULAR; INTRAVENOUS
Status: DISCONTINUED | OUTPATIENT
Start: 2017-06-07 | End: 2017-06-07 | Stop reason: HOSPADM

## 2017-06-07 RX ORDER — MIDAZOLAM HYDROCHLORIDE 1 MG/ML
1 INJECTION INTRAMUSCULAR; INTRAVENOUS
Status: DISCONTINUED | OUTPATIENT
Start: 2017-06-07 | End: 2017-06-07 | Stop reason: HOSPADM

## 2017-06-07 RX ORDER — SCOLOPAMINE TRANSDERMAL SYSTEM 1 MG/1
1 PATCH, EXTENDED RELEASE TRANSDERMAL ONCE
Status: DISCONTINUED | OUTPATIENT
Start: 2017-06-07 | End: 2017-06-07 | Stop reason: SDUPTHER

## 2017-06-07 RX ORDER — LIDOCAINE HYDROCHLORIDE 20 MG/ML
INJECTION, SOLUTION INFILTRATION; PERINEURAL AS NEEDED
Status: DISCONTINUED | OUTPATIENT
Start: 2017-06-07 | End: 2017-06-07 | Stop reason: SURG

## 2017-06-07 RX ORDER — MIDAZOLAM HYDROCHLORIDE 1 MG/ML
2 INJECTION INTRAMUSCULAR; INTRAVENOUS
Status: DISCONTINUED | OUTPATIENT
Start: 2017-06-07 | End: 2017-06-07 | Stop reason: HOSPADM

## 2017-06-07 RX ORDER — PROMETHAZINE HYDROCHLORIDE 25 MG/ML
12.5 INJECTION, SOLUTION INTRAMUSCULAR; INTRAVENOUS EVERY 6 HOURS PRN
Status: DISCONTINUED | OUTPATIENT
Start: 2017-06-07 | End: 2017-06-09 | Stop reason: HOSPADM

## 2017-06-07 RX ORDER — SUCCINYLCHOLINE CHLORIDE 20 MG/ML
INJECTION INTRAMUSCULAR; INTRAVENOUS AS NEEDED
Status: DISCONTINUED | OUTPATIENT
Start: 2017-06-07 | End: 2017-06-07 | Stop reason: SURG

## 2017-06-07 RX ORDER — SCOLOPAMINE TRANSDERMAL SYSTEM 1 MG/1
1 PATCH, EXTENDED RELEASE TRANSDERMAL ONCE
Status: COMPLETED | OUTPATIENT
Start: 2017-06-07 | End: 2017-06-08

## 2017-06-07 RX ORDER — IPRATROPIUM BROMIDE AND ALBUTEROL SULFATE 2.5; .5 MG/3ML; MG/3ML
3 SOLUTION RESPIRATORY (INHALATION) ONCE AS NEEDED
Status: DISCONTINUED | OUTPATIENT
Start: 2017-06-07 | End: 2017-06-07 | Stop reason: HOSPADM

## 2017-06-07 RX ORDER — PANTOPRAZOLE SODIUM 40 MG/10ML
40 INJECTION, POWDER, LYOPHILIZED, FOR SOLUTION INTRAVENOUS
Status: DISCONTINUED | OUTPATIENT
Start: 2017-06-08 | End: 2017-06-09 | Stop reason: HOSPADM

## 2017-06-07 RX ORDER — SUFENTANIL CITRATE 50 UG/ML
INJECTION EPIDURAL; INTRAVENOUS AS NEEDED
Status: DISCONTINUED | OUTPATIENT
Start: 2017-06-07 | End: 2017-06-07 | Stop reason: SURG

## 2017-06-07 RX ORDER — MORPHINE SULFATE IN 0.9 % NACL 50 MG/50ML
PATIENT CONTROLLED ANALGESIA SYRINGE INTRAVENOUS CONTINUOUS
Status: DISCONTINUED | OUTPATIENT
Start: 2017-06-07 | End: 2017-06-08

## 2017-06-07 RX ADMIN — SODIUM CHLORIDE, POTASSIUM CHLORIDE, SODIUM LACTATE AND CALCIUM CHLORIDE 20 ML/HR: 600; 310; 30; 20 INJECTION, SOLUTION INTRAVENOUS at 08:12

## 2017-06-07 RX ADMIN — PROMETHAZINE HYDROCHLORIDE 12.5 MG: 25 INJECTION INTRAMUSCULAR; INTRAVENOUS at 22:57

## 2017-06-07 RX ADMIN — SUFENTANIL CITRATE 20 MCG: 50 INJECTION, SOLUTION EPIDURAL; INTRAVENOUS at 09:59

## 2017-06-07 RX ADMIN — METRONIDAZOLE 500 MG: 500 INJECTION, SOLUTION INTRAVENOUS at 09:24

## 2017-06-07 RX ADMIN — ONDANSETRON 4 MG: 2 SOLUTION INTRAMUSCULAR; INTRAVENOUS at 19:20

## 2017-06-07 RX ADMIN — SODIUM CHLORIDE, POTASSIUM CHLORIDE, SODIUM LACTATE AND CALCIUM CHLORIDE 140 ML/HR: 600; 310; 30; 20 INJECTION, SOLUTION INTRAVENOUS at 13:39

## 2017-06-07 RX ADMIN — LIDOCAINE HYDROCHLORIDE 0.5 ML: 10 INJECTION, SOLUTION EPIDURAL; INFILTRATION; INTRACAUDAL; PERINEURAL at 07:58

## 2017-06-07 RX ADMIN — DEXAMETHASONE SODIUM PHOSPHATE 4 MG: 4 INJECTION, SOLUTION INTRAMUSCULAR; INTRAVENOUS at 08:02

## 2017-06-07 RX ADMIN — MIDAZOLAM HYDROCHLORIDE 2 MG: 1 INJECTION, SOLUTION INTRAMUSCULAR; INTRAVENOUS at 09:21

## 2017-06-07 RX ADMIN — SODIUM CHLORIDE, POTASSIUM CHLORIDE, SODIUM LACTATE AND CALCIUM CHLORIDE 140 ML/HR: 600; 310; 30; 20 INJECTION, SOLUTION INTRAVENOUS at 21:50

## 2017-06-07 RX ADMIN — ONDANSETRON 4 MG: 2 SOLUTION INTRAMUSCULAR; INTRAVENOUS at 13:50

## 2017-06-07 RX ADMIN — HYDROMORPHONE HYDROCHLORIDE 2 MG: 2 INJECTION, SOLUTION INTRAMUSCULAR; INTRAVENOUS; SUBCUTANEOUS at 11:32

## 2017-06-07 RX ADMIN — FAMOTIDINE 20 MG: 10 INJECTION, SOLUTION INTRAVENOUS at 08:02

## 2017-06-07 RX ADMIN — ONDANSETRON 4 MG: 2 INJECTION INTRAMUSCULAR; INTRAVENOUS at 12:39

## 2017-06-07 RX ADMIN — CEFAZOLIN SODIUM 2 G: 2 SOLUTION INTRAVENOUS at 17:44

## 2017-06-07 RX ADMIN — Medication: at 15:10

## 2017-06-07 RX ADMIN — ROCURONIUM BROMIDE 10 MG: 10 INJECTION INTRAVENOUS at 09:53

## 2017-06-07 RX ADMIN — SODIUM CHLORIDE, POTASSIUM CHLORIDE, SODIUM LACTATE AND CALCIUM CHLORIDE 9 ML/HR: 600; 310; 30; 20 INJECTION, SOLUTION INTRAVENOUS at 12:12

## 2017-06-07 RX ADMIN — LIDOCAINE HYDROCHLORIDE 1 EACH: 40 SOLUTION TOPICAL at 09:54

## 2017-06-07 RX ADMIN — ROCURONIUM BROMIDE 40 MG: 10 INJECTION INTRAVENOUS at 10:00

## 2017-06-07 RX ADMIN — SCOPOLAMINE 1 PATCH: 1 PATCH, EXTENDED RELEASE TRANSDERMAL at 07:58

## 2017-06-07 RX ADMIN — ENOXAPARIN SODIUM 40 MG: 40 INJECTION SUBCUTANEOUS at 09:20

## 2017-06-07 RX ADMIN — SUFENTANIL CITRATE 30 MCG: 50 INJECTION, SOLUTION EPIDURAL; INTRAVENOUS at 09:52

## 2017-06-07 RX ADMIN — SUCCINYLCHOLINE CHLORIDE 120 MG: 20 INJECTION, SOLUTION INTRAMUSCULAR; INTRAVENOUS at 09:54

## 2017-06-07 RX ADMIN — CEFAZOLIN SODIUM 2 G: 2 SOLUTION INTRAVENOUS at 10:05

## 2017-06-07 RX ADMIN — SODIUM CHLORIDE, POTASSIUM CHLORIDE, SODIUM LACTATE AND CALCIUM CHLORIDE 9 ML/HR: 600; 310; 30; 20 INJECTION, SOLUTION INTRAVENOUS at 07:58

## 2017-06-07 RX ADMIN — PROPOFOL 150 MG: 10 INJECTION, EMULSION INTRAVENOUS at 09:54

## 2017-06-07 RX ADMIN — LIDOCAINE HYDROCHLORIDE 50 MG: 20 INJECTION, SOLUTION INFILTRATION; PERINEURAL at 09:53

## 2017-06-07 RX ADMIN — METOCLOPRAMIDE 10 MG: 5 INJECTION, SOLUTION INTRAMUSCULAR; INTRAVENOUS at 15:25

## 2017-06-07 NOTE — ANESTHESIA PREPROCEDURE EVALUATION
Anesthesia Evaluation     Patient summary reviewed   history of anesthetic complications: PONV  NPO Solid Status: > 8 hours       Airway   Mallampati: II  TM distance: >3 FB  Neck ROM: full  Dental      Pulmonary    (-) asthma, sleep apnea, not a smoker  Cardiovascular   Exercise tolerance: good (4-7 METS)    ECG reviewed    (+) hypertension, hyperlipidemia  (-) pacemaker, past MI, angina, cardiac stents      Neuro/Psych  (-) seizures, CVA  GI/Hepatic/Renal/Endo    (+) obesity,  GERD,   (-) liver disease, no renal disease, diabetes    Musculoskeletal     Abdominal    Substance History      OB/GYN    (-)  Pregnant        Other                                        Anesthesia Plan    ASA 2     general     intravenous induction   Anesthetic plan and risks discussed with patient.

## 2017-06-07 NOTE — PROGRESS NOTES
Patient Care Team:  Ludwig Hayes MD as PCP - General  Ludwig Hayes MD as PCP - Family Medicine  DELON Burgess as Referring Physician (Family Medicine)  Ho Yoder MD as Cardiologist (Cardiology)    Reason for Visit:  Surgical Weight loss    Subjective     Patient is a 47 y.o. female presents with morbid obesity and her Body mass index is 36.67 kg/(m^2)..      She is here for discussion of surgical weight loss options. She stated she has been with the disease of obesity for year(s). She stated she suffers from her lipidemia, fatty liver disease and hypertension due to her weight gain. She stated that weight loss helps alleviate these symptoms. She stated that she has tried multiple diet regimens including participating in a medically supervised weight loss program to help with weight loss. She stated that she has attempted these conservative methods for weight loss without maintaining long term success. Today she would like to discuss surgical weight loss options such as the Laparoscopic Sleeve Gastrectomy or the Laparoscopic R - Y Gastric Bypass.      Review of Systems  General ROS: negative  Respiratory ROS: no cough, shortness of breath, or wheezing  Cardiovascular ROS: no chest pain or dyspnea on exertion  Gastrointestinal ROS: no abdominal pain, change in bowel habits, or black or bloody stools  Musculoskeletal ROS: negative  Remaining ROS negative     History  Past Medical History:   Diagnosis Date   • Fatty liver    • Granuloma of skin determined by biopsy     Pyrogenic on left arch removed by Dr Freddy Mao on    • Heartburn     mild-she uses PPI's as needed     • Hyperlipidemia    • Hypertension    • Migraines     Occasionally    • Obesity    • PONV (postoperative nausea and vomiting)      Past Surgical History:   Procedure Laterality Date   • ABDOMINOPLASTY     • BREAST IMPLANT SURGERY     •  SECTION     • CHOLECYSTECTOMY     • ENDOMETRIAL ABLATION     • UPPER  GASTROINTESTINAL ENDOSCOPY  07/12/2016    Dr Castaneda at [facility]      Family History   Problem Relation Age of Onset   • Obesity Mother    • Hypertension Mother    • No Known Problems Father      Social History   Substance Use Topics   • Smoking status: Never Smoker   • Smokeless tobacco: Never Used   • Alcohol use 0.6 oz/week     1 Glasses of wine per week      Comment: OCC     Prescriptions Prior to Admission   Medication Sig Dispense Refill Last Dose   • ergocalciferol (ERGOCALCIFEROL) 08053 UNITS capsule Take 1 capsule by mouth 2 (Two) Times a Week. 8 capsule 3 Past Week at Unknown time   • hydrochlorothiazide (HYDRODIURIL) 25 MG tablet Take 25 mg by mouth daily.   6/6/2017 at 0600   • lisinopril (PRINIVIL,ZESTRIL) 10 MG tablet Take 10 mg by mouth daily.   6/6/2017 at 0600   • omeprazole (PriLOSEC) 20 MG capsule Take 20 mg by mouth daily.   6/6/2017 at 0600   • potassium chloride (MICRO-K) 10 MEQ CR capsule Take 10 mEq by mouth 2 (Two) Times a Day.   6/6/2017 at 1730     Allergies:  Review of patient's allergies indicates no known allergies.    Objective     Vital Signs  Temp:  [98.5 °F (36.9 °C)] 98.5 °F (36.9 °C)  Heart Rate:  [74-80] 74  Resp:  [16-18] 18  BP: (146)/(97) 146/97  There is no height or weight on file to calculate BMI.  There were no vitals filed for this visit.    Physical Exam:     HEENT: extra ocular movement intact  GI: Soft, non-tender, normal bowel sounds; no bruits, organomegaly or masses.  Abnormal shape: obese  Musculoskeletal: inspection - no abnormality  Neurologic: alert, oriented, normal speech, no focal findings or movement disorder noted       Results Review:   I reviewed the patient's new clinical results.        Assessment/Plan   Patient Active Problem List   Diagnosis   • Body mass index 36.0-36.9, adult   • Vitamin D deficiency   • Pre-op exam Dr Sanchez Gastric sleeve   • Essential hypertension     Plan:  I believe this patient will be a good candidate for  weight loss surgery.    She has chosen laparoscopic sleeve gastrectomy. I agree with this decision.  I have discussed the Mi - Y Gastric Bypass, laparoscopic sleeve gastrectomy and the Laparoscopic Gastric Band procedures to provide the alternatives which includes non surgical weight loss options as well.  We discussed the benefits of the surgeries including the benefit of weight loss and the possible reversal of co-morbid conditions associated with morbid obesity.  We discussed the complications and risks which include the risk of perforation, leakage,bleeding, intra-abdominal organ injury, stenosis or ulcerations, the risk of deep vein thrombosis formation leading to possible pulmonary embolisms, the risk of death.  I explained to the patient the possibility that this procedure may not be performed laparoscopically and may require being converted to an opened procedure or aborted due to abnormal anatomy.  Also postoperatively there is a risk of increased GERD symptoms after this procedure.  Upon completion of our discussion and addressing and answering her questions to her satisfation, informed consent was obtained.   She will be scheduled accordingly for a laparoscopic Sleeve gastrectomy procedure.          I discussed the patients findings and my recommendations with patient.     Dr. Andrews Sanchez MD [facility]    06/07/17  9:21 AM  Patient Care Team:  Ludwig Hayes MD as PCP - General  Ludwig Hayes MD as PCP - Family Medicine  DELON Burgess as Referring Physician (Family Medicine)  Ho Yoder MD as Cardiologist (Cardiology)

## 2017-06-07 NOTE — ANESTHESIA POSTPROCEDURE EVALUATION
Patient: Alona Owens    Procedure Summary     Date Anesthesia Start Anesthesia Stop Room / Location    06/07/17 0949 1148  [facility] OR 04 / [facility] OR       Procedure Diagnosis Surgeon Provider    GASTRIC SLEEVE LAPAROSCOPIC, HIATAL HERNIA REPAIR (N/A Abdomen) Body mass index 36.0-36.9, adult; Pre-op exam  (Body mass index 36.0-36.9, adult [Z68.36]; Pre-op exam [Z01.818]) MD Jose C Martinez CRNA          Anesthesia Type: general  Last vitals  BP      Temp      Pulse     Resp      SpO2        Post Anesthesia Care and Evaluation    Patient location during evaluation: PACU  Patient participation: complete - patient participated  Level of consciousness: awake  Pain score: 2  Pain management: adequate  Airway patency: patent  Anesthetic complications: No anesthetic complications  PONV Status: none  Cardiovascular status: acceptable  Respiratory status: acceptable  Hydration status: acceptable    Comments: Blood pressure 140/93, pulse 69, temperature 97.5 °F (36.4 °C), temperature source Axillary, resp. rate 18, SpO2 99 %, not currently breastfeeding.

## 2017-06-07 NOTE — ANESTHESIA PROCEDURE NOTES
Airway  Urgency: elective    Date/Time: 6/7/2017 9:55 AM  End Time:6/7/2017 10:55 AM  Airway not difficult    General Information and Staff    Patient location during procedure: OR  CRNA: ALAN FERGUSON    Indications and Patient Condition  Indications for airway management: airway protection    Preoxygenated: yes  Mask difficulty assessment: 1 - vent by mask    Final Airway Details  Final airway type: endotracheal airway      Successful airway: ETT  Cuffed: yes   Successful intubation technique: direct laryngoscopy  Facilitating devices/methods: intubating stylet  Endotracheal tube insertion site: oral  Blade: Salgado  Blade size: #2  ETT size: 7.5 mm  Cormack-Lehane Classification: grade IIb - view of arytenoids or posterior of glottis only  Placement verified by: capnometry   Cuff volume (mL): 7  Measured from: lips  ETT to lips (cm): 22  Number of attempts at approach: 1

## 2017-06-07 NOTE — PLAN OF CARE
Problem: Patient Care Overview (Adult)  Goal: Plan of Care Review  Outcome: Ongoing (interventions implemented as appropriate)    06/07/17 3961   Coping/Psychosocial Response Interventions   Plan Of Care Reviewed With patient   Patient Care Overview   Progress no change   Outcome Evaluation   Outcome Summary/Follow up Plan Pt c/o of n/v, reglan has help the best pt states. Voiding per bathroom. PCA in place and pt is noting relief from pain. Pt is NPO with ice chips and sips with meds. Laps dry and intact.O2 on 3L       Goal: Adult Individualization and Mutuality  Outcome: Ongoing (interventions implemented as appropriate)  Goal: Discharge Needs Assessment  Outcome: Ongoing (interventions implemented as appropriate)    Problem: Perioperative Period (Adult)  Goal: Signs and Symptoms of Listed Potential Problems Will be Absent or Manageable (Perioperative Period)  Outcome: Ongoing (interventions implemented as appropriate)

## 2017-06-07 NOTE — PLAN OF CARE
Problem: Patient Care Overview (Adult)  Goal: Plan of Care Review  Outcome: Ongoing (interventions implemented as appropriate)    06/07/17 0986   Coping/Psychosocial Response Interventions   Plan Of Care Reviewed With patient   Patient Care Overview   Progress no change   Outcome Evaluation   Outcome Summary/Follow up Plan no changes in the preop holding phase

## 2017-06-07 NOTE — OP NOTE
Laparoscopic Sleeve Gastrectomy/ with hiatal hernia defect     Pre-operative diagnosis: Morbid Obesity    Post-operative diagnosis: as above with a Hiatal hernia      Indications: The patient was admitted to the hospital with history of morbid obesity.   she is scheduled for a Laparoscopic Sleeve Gastrectomy.       Surgeon: Andrews Sanchez MD     Assistants: Cecy    Anesthesia: General endotracheal anesthesia    ASA Class: 3      Procedure Details     After the patient was explained the alternatives, benefits, complications, and risks of the laparoscopic sleeve gastrectomy informed consent was provided.  The patient was brought to the or suite after receiving preoperative antibiotics medications and anticoagulation.  The patient was placed under general anesthesia. The patient was then prepped and draped in standard fashion.  We performed a surgical Timeout.  A 40 [place] bougie was placed by anesthesia into the oropharynx down into the stomach and placed to suction.  The patient had a previous tummy tuck which removed the previous laparoscopic cholecystectomy scars.  I made the determination to approach creating a pneumoperitoneum with a Veress needle.  I locally anesthetized area and a left subcostal location for placement of the Veress needle.  Confirmation of needle in the abdominal cavity was confirmed with saline.  I then insufflated the abdominal cavity to a pressure of 15 mmHg.  A 5 mm trocar was placed in the left upper quadrant with camera assist, I identified the Veress needle noting that it was not involved in abdominal organ structures and removed it safely.  I then proceeded to locally anesthetize a supraumbilical site for placement of a 12 mm incision which was followed by placement of a 12 mm trocar into the abdominal cavity under direct visualization. A 5 mm incision was placed in the subxiphoid location for placement of a 5 mm trocar under direct visualization.  I removed this trocar and replaced  it with a Eleazar liver retractor.  Once adequate exposure of the stomach was obtained I also noted greater than 5 cm hiatal hernia defect.  The stomach was reducible and this is the first indication of atrial vital hernia visualized laparoscopically.  I proceeded with placement of a 15 mm trocar and lateral to this trocar a 12 mm trocar was also placed in the right upper quadrant space both under direct visualization.   I then requested anesthesia to first break the seal caused by suction with a puff of air and then advance the 40 [place] bougie.  I then proceeded to guide the bougie within the stomach and positioned it accordingly for creation of the sleeve gastrectomy.  The bougie was then placed to suction.  I then proceeded with ligating the greater curvature vessels starting at my most distal ligation site 4-6 cm from the pylorus.  I continued proximally along the greater curvature ligating the vessels as well as a short gastrics.  Once completion of ligation of the vessels was obtained. I dissected away attachments found posteriorly to the stomach.  Care was made to assure no injury to the pancreas. Completion of my dissection was obtained once visualization of the posterior left crural muscle seen.  I then performed repairing the anterior portion of the hiatal hernia defect with 2 interrupted 2-0 Ethibond sutures laparoscopically.  I first down the hernia sac utilizing harmonic scalpel around the left and right crura muscles.  Identification of the esophagus was made and I assured that the stomach did not retract/recoil back into the thoracic cavity.  Adequate length of the esophagus was approximately 4 cm.  The sleeve gastrectomy was then created.  I utilized staples that were reinforced with seam guard.  My most distal staple line was approximately 4-6 cm from the pylorus and I continued proximally along the greater curvature assuring that the width from the angularis incisura was at least 3 cm.  My final  proximal stapler was at least 1-2 cm from the GE junction.  I then assessed the staple line to observe for hemostasis.  Once hemostasis was confirmed I then proceeded with my leak test.  This was performed by placing the patient in Trendelenburg and irrigating around the sleeve gastrectomy saline. I sure the staple line was submerged under saline and then requested anesthesia to insufflate the bougie with air. I then assess for air bubbles along the staple line.  Once there was no evidence of leakage. I then imbricated omentum at the most proximal portion of the staple line utilizing an 2-0 Vicryl suture under direct visualization with omentum. I then requested that the bougie be placed back to suction and removed the fluid from the abdominal cavity.  The bougie was then removed under direct visualization off suction and after the suction seal was broken with air under direct visualization.  I then proceeded to remove the resected portion of the stomach through the 15 mm trocar site under direct visualization.  I reassessed for hemostasis, and once this was confirmed proceeded to closing wound site's Fascial with suture.  Closing the 15 mm trocar and supraumbilical trocar sites with a Eleuterio Bansal device using an 0 Vicryl suture. The liver retractor was removed under direct visualization as well as the pneumoperitoneum and remaining trochars.  Then re-locally anesthetized skin wounds for closure.  Skin wounds were closed with 4-0 Monocryl.  Prior to closing skin wounds all lap pads and attachments were accounted for.     Black staples: 1  Green staples: 5    Findings: Hiatal hernia    Estimated Blood Loss: 50 ml           Drains: none           Total IV Fluids: 1400 ml           Specimens:   ID Type Source Tests Collected by Time Destination   A : Fundus of Stomach Tissue Stomach TISSUE EXAM Andrews Sanchez MD 6/7/2017 1022               Implants:     Implant Name Type Inv. Item Serial No.  Lot No.  LRB No. Used   STAPLELINE SEAMGUARD FLX 60 - [id number] Implant STAPLELINE SEAMGUARD FLX 60    [employer] [id number] N/A 5              Complications:  none           Disposition: PACU - hemodynamically stable.           Condition: stable

## 2017-06-07 NOTE — PLAN OF CARE
Problem: Perioperative Period (Adult)  Goal: Signs and Symptoms of Listed Potential Problems Will be Absent or Manageable (Perioperative Period)  Outcome: Ongoing (interventions implemented as appropriate)    06/07/17 0756   Perioperative Period   Problems Assessed (Perioperative Period) pain;hypothermia;hypoxia/hypoxemia;situational response   Problems Present (Perioperative Period) situational response

## 2017-06-07 NOTE — BRIEF OP NOTE
GASTRIC SLEEVE LAPAROSCOPIC  Procedure Note    Alona Owens  6/7/2017    Pre-op Diagnosis:   Body mass index 36.0-36.9, adult [Z68.36]  Pre-op exam [Z01.818]    Post-op Diagnosis:     Post-Op Diagnosis Codes:     * Body mass index 36.0-36.9, adult [Z68.36]     * Pre-op exam [Z01.818]    Procedure/CPT® Codes:      Procedure(s):  GASTRIC SLEEVE LAPAROSCOPIC, HIATAL HERNIA REPAIR    Surgeon(s):  Andrews Sanchez MD    Anesthesia: General    Staff:   Circulator: Erik Oshea RN  Scrub Person: Kelli Menjivar; Kell Peña  Assistant: Cecy Patel    Estimated Blood Loss: *No blood loss documented*  Urine Voided: * No values recorded between 6/7/2017  9:45 AM and 6/7/2017 11:30 AM *    Specimens:                  ID Type Source Tests Collected by Time Destination   A : Fundus of Stomach Tissue Stomach TISSUE EXAM Andrews Sanchez MD 6/7/2017 1022                 Findings: hiatal hernia defect 5 cm    Complications: none      Andrews Sanchez MD     Date: 6/7/2017  Time: 11:30 AM

## 2017-06-07 NOTE — PLAN OF CARE
Problem: Patient Care Overview (Adult)  Goal: Plan of Care Review  Outcome: Ongoing (interventions implemented as appropriate)    06/07/17 1245   Coping/Psychosocial Response Interventions   Plan Of Care Reviewed With patient   Patient Care Overview   Progress improving   Outcome Evaluation   Outcome Summary/Follow up Plan prn meds for nausea pain controlled meets criteria for pacu discharge         Problem: Perioperative Period (Adult)  Goal: Signs and Symptoms of Listed Potential Problems Will be Absent or Manageable (Perioperative Period)  Outcome: Ongoing (interventions implemented as appropriate)

## 2017-06-08 PROBLEM — Z98.84 STATUS POST LAPAROSCOPIC SLEEVE GASTRECTOMY: Status: ACTIVE | Noted: 2017-06-07

## 2017-06-08 LAB
ALBUMIN SERPL-MCNC: 3.8 G/DL (ref 3.5–5)
ALBUMIN/GLOB SERPL: 1.2 G/DL (ref 1.1–2.5)
ALP SERPL-CCNC: 49 U/L (ref 24–120)
ALT SERPL W P-5'-P-CCNC: 115 U/L (ref 0–54)
ANION GAP SERPL CALCULATED.3IONS-SCNC: 10 MMOL/L (ref 4–13)
AST SERPL-CCNC: 71 U/L (ref 7–45)
BILIRUB SERPL-MCNC: 0.7 MG/DL (ref 0.1–1)
BUN BLD-MCNC: 9 MG/DL (ref 5–21)
BUN/CREAT SERPL: 13.4 (ref 7–25)
CALCIUM SPEC-SCNC: 8.5 MG/DL (ref 8.4–10.4)
CHLORIDE SERPL-SCNC: 99 MMOL/L (ref 98–110)
CO2 SERPL-SCNC: 30 MMOL/L (ref 24–31)
CREAT BLD-MCNC: 0.67 MG/DL (ref 0.5–1.4)
GFR SERPL CREATININE-BSD FRML MDRD: 95 ML/MIN/1.73
GLOBULIN UR ELPH-MCNC: 3.1 GM/DL
GLUCOSE BLD-MCNC: 99 MG/DL (ref 70–100)
POTASSIUM BLD-SCNC: 2.8 MMOL/L (ref 3.5–5.3)
PROT SERPL-MCNC: 6.9 G/DL (ref 6.3–8.7)
SODIUM BLD-SCNC: 139 MMOL/L (ref 135–145)

## 2017-06-08 PROCEDURE — 25010000003 CEFAZOLIN PER 500 MG: Performed by: SURGERY

## 2017-06-08 PROCEDURE — 25010000002 ONDANSETRON PER 1 MG: Performed by: SURGERY

## 2017-06-08 PROCEDURE — 25010000003 POTASSIUM CHLORIDE 10 MEQ/100ML SOLUTION: Performed by: SURGERY

## 2017-06-08 PROCEDURE — 25010000002 ENOXAPARIN PER 10 MG: Performed by: SURGERY

## 2017-06-08 PROCEDURE — 80053 COMPREHEN METABOLIC PANEL: CPT | Performed by: SURGERY

## 2017-06-08 RX ORDER — POTASSIUM CHLORIDE 20MEQ/15ML
20 LIQUID (ML) ORAL DAILY
Status: DISCONTINUED | OUTPATIENT
Start: 2017-06-08 | End: 2017-06-08

## 2017-06-08 RX ORDER — SODIUM CHLORIDE, SODIUM LACTATE, POTASSIUM CHLORIDE, CALCIUM CHLORIDE 600; 310; 30; 20 MG/100ML; MG/100ML; MG/100ML; MG/100ML
50 INJECTION, SOLUTION INTRAVENOUS CONTINUOUS
Status: DISCONTINUED | OUTPATIENT
Start: 2017-06-08 | End: 2017-06-09 | Stop reason: HOSPADM

## 2017-06-08 RX ORDER — POTASSIUM CHLORIDE 7.45 MG/ML
10 INJECTION INTRAVENOUS ONCE
Status: COMPLETED | OUTPATIENT
Start: 2017-06-08 | End: 2017-06-08

## 2017-06-08 RX ORDER — POTASSIUM CHLORIDE 750 MG/1
10 CAPSULE, EXTENDED RELEASE ORAL 2 TIMES DAILY
Status: DISCONTINUED | OUTPATIENT
Start: 2017-06-08 | End: 2017-06-08

## 2017-06-08 RX ADMIN — SODIUM CHLORIDE, POTASSIUM CHLORIDE, SODIUM LACTATE AND CALCIUM CHLORIDE 140 ML/HR: 600; 310; 30; 20 INJECTION, SOLUTION INTRAVENOUS at 06:04

## 2017-06-08 RX ADMIN — HYDROCODONE BITARTRATE AND ACETAMINOPHEN 10 ML: 7.5; 325 SOLUTION ORAL at 16:18

## 2017-06-08 RX ADMIN — POTASSIUM PHOSPHATE, MONOBASIC 500 MG: 500 TABLET, SOLUBLE ORAL at 21:08

## 2017-06-08 RX ADMIN — HYDROCODONE BITARTRATE AND ACETAMINOPHEN 10 ML: 7.5; 325 SOLUTION ORAL at 12:24

## 2017-06-08 RX ADMIN — ENOXAPARIN SODIUM 40 MG: 40 INJECTION SUBCUTANEOUS at 09:02

## 2017-06-08 RX ADMIN — CEFAZOLIN SODIUM 2 G: 2 SOLUTION INTRAVENOUS at 01:56

## 2017-06-08 RX ADMIN — HYDROCODONE BITARTRATE AND ACETAMINOPHEN 10 ML: 7.5; 325 SOLUTION ORAL at 21:09

## 2017-06-08 RX ADMIN — PANTOPRAZOLE SODIUM 40 MG: 40 INJECTION, POWDER, FOR SOLUTION INTRAVENOUS at 06:05

## 2017-06-08 RX ADMIN — POTASSIUM PHOSPHATE, MONOBASIC 500 MG: 500 TABLET, SOLUBLE ORAL at 18:26

## 2017-06-08 RX ADMIN — ONDANSETRON 4 MG: 2 SOLUTION INTRAMUSCULAR; INTRAVENOUS at 09:02

## 2017-06-08 RX ADMIN — SODIUM CHLORIDE, POTASSIUM CHLORIDE, SODIUM LACTATE AND CALCIUM CHLORIDE 125 ML/HR: 600; 310; 30; 20 INJECTION, SOLUTION INTRAVENOUS at 14:12

## 2017-06-08 RX ADMIN — POTASSIUM CHLORIDE 20 MEQ: 20 SOLUTION ORAL at 14:10

## 2017-06-08 RX ADMIN — ONDANSETRON 4 MG: 2 SOLUTION INTRAMUSCULAR; INTRAVENOUS at 01:57

## 2017-06-08 RX ADMIN — ONDANSETRON 4 MG: 2 SOLUTION INTRAMUSCULAR; INTRAVENOUS at 20:01

## 2017-06-08 RX ADMIN — POTASSIUM CHLORIDE 10 MEQ: 7.46 INJECTION, SOLUTION INTRAVENOUS at 16:19

## 2017-06-08 RX ADMIN — ONDANSETRON 4 MG: 2 SOLUTION INTRAMUSCULAR; INTRAVENOUS at 14:11

## 2017-06-08 NOTE — PLAN OF CARE
Problem: Patient Care Overview (Adult)  Goal: Plan of Care Review  Outcome: Ongoing (interventions implemented as appropriate)    06/08/17 1505   Coping/Psychosocial Response Interventions   Plan Of Care Reviewed With patient   Patient Care Overview   Progress no change   Outcome Evaluation   Outcome Summary/Follow up Plan No c/o nausea this shift. Zofran OTC continues. PCA DC'd and medicated x 1 for pain. Up ambulating in moreira. K+ 2.8. PO potassium ordered.       Goal: Adult Individualization and Mutuality  Outcome: Ongoing (interventions implemented as appropriate)  Goal: Discharge Needs Assessment  Outcome: Ongoing (interventions implemented as appropriate)    Problem: Perioperative Period (Adult)  Goal: Signs and Symptoms of Listed Potential Problems Will be Absent or Manageable (Perioperative Period)  Outcome: Ongoing (interventions implemented as appropriate)

## 2017-06-08 NOTE — PROGRESS NOTES
"Patient Care Team:  Ludwig Hayes MD as PCP - General  Ludwig Hayes MD as PCP - Family Medicine  DELON Burgess as Referring Physician (Family Medicine)  Ho Yoder MD as Cardiologist (Cardiology)    Chief Complaint: S/P Laparoscopic Sleeve Gastrectomy    Alona Owens is POD 1 day ago from a Laparoscopic Sleeve Gastrectomy.  She this is resolved this a.m.  She is able to tolerate her ice chips and water and will attempt her clears today.  She has been ambulating and utilizing the incentive spirometer.  She has mild epigastric tenderness right at her incision site the wound sites are minimal and pain as well per her statement..   Patient rates her pain a 4  Subjective     Interval History:     Patient Complaints: Nausea  Patient Denies: heartburn or reflux  History taken from: Alona MUNOZ Roman    Review of Systems:     Review of Systems - Respiratory ROS: negative  Cardiovascular ROS: no chest pain or dyspnea on exertion    Objective     Vital Signs  /79 (BP Location: Right arm, Patient Position: Lying)  Pulse 77  Temp 98.2 °F (36.8 °C) (Oral)   Resp 16  Ht 63\" (160 cm)  Wt 207 lb (93.9 kg)  LMP Comment: X2 YEARS AGO  SpO2 99%  Breastfeeding? No  BMI 36.67 kg/m2    Physical Exam:    HEENT: extra ocular movement intact  Respiratory: appears well, vitals normal, no respiratory distress, acyanotic, normal RR, chest clear, no wheezing, crepitations, rhonchi, normal symmetric air entry  Cardiovascular: Regular rate and rhythm, S1, S2 normal, no murmur, click, rub or gallop  GI: Abnormal shape: obese  Auscultation: Normal bowel sounds.  No bruits.  Tenderness: epigastric  Musculoskeletal: inspection - no abnormality  Skin: Skin color, texture, turgor normal. No rashes or lesions  Neurologic: alert, oriented, normal speech, no focal findings or movement disorder noted     Results Review:    None    None    Medication Review:   Hospital Medications (active)       Dose Frequency Start End "    ceFAZolin (ANCEF) IVPB (duplex) 2 g 2 g Every 8 Hours 6/7/2017 6/8/2017    Sig - Route: Infuse 2,000 mg into a venous catheter Every 8 (Eight) Hours. - Intravenous    HYDROcodone-acetaminophen (HYCET) 7.5-325 MG/15ML solution 10 mL 10 mL Every 4 Hours PRN 6/8/2017 6/18/2017    Sig - Route: Take 10 mL by mouth Every 4 (Four) Hours As Needed for Moderate Pain (4-6). - Oral    ketorolac (TORADOL) injection 30 mg 30 mg Every 6 Hours PRN 6/7/2017 6/12/2017    Sig - Route: Infuse 30 mg into a venous catheter Every 6 (Six) Hours As Needed for Moderate Pain (4-6). - Intravenous    lactated ringers infusion 125 mL/hr Continuous 6/7/2017     Sig - Route: Infuse 125 mL/hr into a venous catheter Continuous. - Intravenous    lisinopril (PRINIVIL,ZESTRIL) tablet 10 mg 10 mg Daily 6/8/2017     Sig - Route: Take 1 tablet by mouth Daily. - Oral    metoclopramide (REGLAN) injection 10 mg 10 mg Every 6 Hours PRN 6/7/2017     Sig - Route: Infuse 2 mL into a venous catheter Every 6 (Six) Hours As Needed for Vomiting. - Intravenous    naloxone (NARCAN) injection 0.1 mg 0.1 mg Every 5 Minutes PRN 6/7/2017     Sig - Route: Infuse 0.25 mL into a venous catheter Every 5 (Five) Minutes As Needed for Opioid Reversal or Respiratory Depression (see administration instructions). - Intravenous    ondansetron (ZOFRAN) injection 4 mg 4 mg Once As Needed 6/7/2017 6/7/2017    Sig - Route: Infuse 2 mL into a venous catheter 1 (One) Time As Needed for Nausea or Vomiting. - Intravenous    ondansetron (ZOFRAN) injection 4 mg 4 mg Every 6 Hours 6/7/2017     Sig - Route: Infuse 2 mL into a venous catheter Every 6 (Six) Hours. - Intravenous    pantoprazole (PROTONIX) injection 40 mg 40 mg Every Early Morning 6/8/2017     Sig - Route: Infuse 10 mL into a venous catheter Every Morning. - Intravenous    promethazine (PHENERGAN) injection 12.5 mg 12.5 mg Every 6 Hours PRN 6/7/2017     Sig - Route: Infuse 0.5 mL into a venous catheter Every 6 (Six) Hours As  Needed for Nausea or Vomiting. - Intravenous    Scopolamine (TRANSDERM-SCOP) 1.5 MG/3DAYS patch 1 patch 1 patch Once 6/7/2017 6/8/2017    Sig - Route: Place 1 patch on the skin 1 (One) Time. - Transdermal    sodium chloride 0.9 % flush 1-10 mL 1-10 mL As Needed 6/7/2017     Sig - Route: Infuse 1-10 mL into a venous catheter As Needed for Line Care. - Intravenous    atropine sulfate injection 0.5 mg (Discontinued) 0.5 mg Once As Needed 6/7/2017 6/7/2017    Sig - Route: Infuse 5 mL into a venous catheter 1 (One) Time As Needed for Bradycardia (symptomatic bradycardia (hypotension, dizziness, confusion). Notify attending anesthesiologist.). - Intravenous    Reason for Discontinue: Patient Transfer    bupivacaine (PF) 0.5 % 20 mL, lidocaine 1 % 20 mL mixture (Discontinued)  As Needed 6/7/2017 6/7/2017    Sig: As Needed.    Reason for Discontinue: Patient Discharge    dextrose (D50W) solution 12.5 g (Discontinued) 12.5 g As Needed 6/7/2017 6/7/2017    Sig - Route: Infuse 25 mL into a venous catheter As Needed for Low Blood Sugar (for blood sugar less than 60). - Intravenous    Reason for Discontinue: Patient Transfer    diphenhydrAMINE (BENADRYL) injection 12.5 mg (Discontinued) 12.5 mg Every 15 Minutes PRN 6/7/2017 6/7/2017    Sig - Route: Infuse 0.25 mL into a venous catheter Every 15 (Fifteen) Minutes As Needed for Itching (May repeat x 1). - Intravenous    Reason for Discontinue: Patient Transfer    enoxaparin (LOVENOX) syringe 40 mg (Discontinued) 40 mg Daily 6/8/2017 6/8/2017    Sig - Route: Inject 0.4 mL under the skin Daily. - Subcutaneous    famotidine (PEPCID) injection 20 mg (Discontinued) 20 mg 60 Minutes Pre-Op 6/7/2017 6/7/2017    Sig - Route: Infuse 2 mL into a venous catheter 60 Minutes Prior to Surgery. - Intravenous    Reason for Discontinue: Patient Transfer    fentaNYL citrate (PF) (SUBLIMAZE) injection 25 mcg (Discontinued) 25 mcg Every 5 Minutes PRN 6/7/2017 6/7/2017    Sig - Route: Infuse 0.5 mL  into a venous catheter Every 5 (Five) Minutes As Needed for Severe Pain (7-10). - Intravenous    Reason for Discontinue: Patient Transfer    hydrALAZINE (APRESOLINE) injection 5 mg (Discontinued) 5 mg Every 10 Minutes PRN 6/7/2017 6/7/2017    Sig - Route: Infuse 0.25 mL into a venous catheter Every 10 (Ten) Minutes As Needed for High Blood Pressure (for systolic blood pressure greater than 180 mmHg or diastolic blood pressure greater than 105 mmHg and heart rate less than 60). - Intravenous    Reason for Discontinue: Patient Transfer    HYDROmorphone (DILAUDID) injection 0.5 mg (Discontinued) 0.5 mg Every 5 Minutes PRN 6/7/2017 6/7/2017    Sig - Route: Infuse 0.5 mg into a venous catheter Every 5 (Five) Minutes As Needed for Severe Pain (7-10). - Intravenous    Reason for Discontinue: Patient Transfer    HYDROmorphone (DILAUDID) injection (Discontinued)  As Needed 6/7/2017 6/7/2017    Sig: As Needed for Severe Pain (7-10).    Reason for Discontinue: Anesthesia Stop    ipratropium-albuterol (DUO-NEB) nebulizer solution 3 mL (Discontinued) 3 mL Once As Needed 6/7/2017 6/7/2017    Sig - Route: Take 3 mL by nebulization 1 (One) Time As Needed for Wheezing or Shortness of Air (bronchospasm). - Nebulization    Reason for Discontinue: Patient Transfer    labetalol (NORMODYNE,TRANDATE) injection 5 mg (Discontinued) 5 mg Every 5 Minutes PRN 6/7/2017 6/7/2017    Sig - Route: Infuse 1 mL into a venous catheter Every 5 (Five) Minutes As Needed for High Blood Pressure (for systolic blood pressure greater than 180 mmHg or diastolic blood pressure greater than 105 mmHg). - Intravenous    Reason for Discontinue: Patient Transfer    lactated ringers infusion (Discontinued) 9 mL/hr Continuous 6/7/2017 6/7/2017    Sig - Route: Infuse 9 mL/hr into a venous catheter Continuous. - Intravenous    Reason for Discontinue: Patient Transfer    lactated ringers infusion (Discontinued) 20 mL/hr Continuous 6/7/2017 6/7/2017    Sig - Route:  "Infuse 20 mL/hr into a venous catheter Continuous. - Intravenous    Reason for Discontinue: Patient Transfer    Cosign for Ordering: Accepted by Andrews Sanchez MD on 6/7/2017 11:30 AM    lidocaine (LTA KIT) 4 % laryngotracheal solution (Discontinued)  As Needed 6/7/2017 6/7/2017    Sig - Route: Apply  topically As Needed. - Topical    Reason for Discontinue: Anesthesia Stop    lidocaine (XYLOCAINE) 2% injection (Discontinued)  As Needed 6/7/2017 6/7/2017    Sig - Route: Inject  as directed As Needed. - Injection    Reason for Discontinue: Anesthesia Stop    meperidine (DEMEROL) injection 12.5 mg (Discontinued) 12.5 mg Every 5 Minutes PRN 6/7/2017 6/7/2017    Sig - Route: Infuse 0.5 mL into a venous catheter Every 5 (Five) Minutes As Needed for Shivering (May repeat x 1). - Intravenous    Reason for Discontinue: Patient Transfer    midazolam (VERSED) injection 1 mg (Discontinued) 1 mg Every 5 Minutes PRN 6/7/2017 6/7/2017    Sig - Route: Infuse 1 mL into a venous catheter Every 5 (Five) Minutes As Needed for Anxiety (Max 4mg midazolam TOTAL). - Intravenous    Reason for Discontinue: Patient Transfer    Linked Group 1:  \"Or\" Linked Group Details        midazolam (VERSED) injection 2 mg (Discontinued) 2 mg Every 5 Minutes PRN 6/7/2017 6/7/2017    Sig - Route: Infuse 2 mL into a venous catheter Every 5 (Five) Minutes As Needed for Anxiety (Max 4mg midazolam TOTAL). - Intravenous    Reason for Discontinue: Patient Transfer    Linked Group 1:  \"Or\" Linked Group Details        Morphine sulfate (PF) injection 2 mg (Discontinued) 2 mg Every 5 Minutes PRN 6/7/2017 6/7/2017    Sig - Route: Infuse 1 mL into a venous catheter Every 5 (Five) Minutes As Needed for Moderate Pain (4-6). - Intravenous    Reason for Discontinue: Patient Transfer    Morphine sulfate PCA 1 mg/mL 50 mL syringe (Discontinued)  Continuous 6/7/2017 6/7/2017    Sig - Route: Infuse  into a venous catheter Continuous. - Intravenous    Reason for Discontinue: " Reorder    morphine sulfate PCA 1 mg/mL 50 mL syringe (Discontinued)  Continuous 6/7/2017 6/8/2017    Sig - Route: Infuse  into a venous catheter Continuous. - Intravenous    naloxone (NARCAN) injection 0.4 mg (Discontinued) 0.4 mg As Needed 6/7/2017 6/7/2017    Sig - Route: Infuse 1 mL into a venous catheter As Needed for Opioid Reversal (unresponsiveness, decrease oxygen saturation). - Intravenous    Reason for Discontinue: Patient Transfer    Propofol (DIPRIVAN) injection (Discontinued)  As Needed 6/7/2017 6/7/2017    Sig: As Needed.    Reason for Discontinue: Anesthesia Stop    rocuronium (ZEMURON) injection (Discontinued)  As Needed 6/7/2017 6/7/2017    Sig - Route: Infuse  into a venous catheter As Needed. - Intravenous    Reason for Discontinue: Anesthesia Stop    sodium chloride (NS) irrigation solution (Discontinued)  As Needed 6/7/2017 6/7/2017    Sig: As Needed.    Reason for Discontinue: Patient Discharge    sodium chloride 0.9 % flush 1-10 mL (Discontinued) 1-10 mL As Needed 6/7/2017 6/7/2017    Sig - Route: Infuse 1-10 mL into a venous catheter As Needed for Line Care. - Intravenous    Reason for Discontinue: Patient Transfer    sodium chloride 0.9 % solution (Discontinued)  As Needed 6/7/2017 6/7/2017    Sig: As Needed.    Reason for Discontinue: Patient Discharge    succinylcholine (ANECTINE) injection (Discontinued)  As Needed 6/7/2017 6/7/2017    Sig - Route: Infuse  into a venous catheter As Needed. - Intravenous    Reason for Discontinue: Anesthesia Stop    SUFentanil (SUFENTA) injection (Discontinued)  As Needed 6/7/2017 6/7/2017    Sig - Route: Infuse  into a venous catheter As Needed for Severe Pain (7-10). - Intravenous    Reason for Discontinue: Anesthesia Stop            Assessment/Plan  POD 1 day ago from Laparoscopic Sleeve Gastrectomy.    Active Problems:    Body mass index 36.0-36.9, adult    Status post laparoscopic sleeve gastrectomy    The patient's nausea and vomiting is improved  throughout her hospital course.  I have advanced her diet to stage I, I have DC'd her PCA pump and encourage continued ambulation and incentive spirometry use.  I will reassess her in the afternoon on her advancement to stage I and if she tolerates it well she will be discharged home.  Today's K levels were below 3.0.  I decided to keep her and provide K+ supplementation.  She will have her labs rechecked in the am.  She presents without hypokalemic symptoms.      Plan for disposition:Where: home    Andrews Sanchez MD  06/08/17  10:38 AM

## 2017-06-08 NOTE — PLAN OF CARE
Problem: Patient Care Overview (Adult)  Goal: Plan of Care Review  Outcome: Ongoing (interventions implemented as appropriate)    06/08/17 0354   Coping/Psychosocial Response Interventions   Plan Of Care Reviewed With patient   Patient Care Overview   Progress no change   Outcome Evaluation   Outcome Summary/Follow up Plan Pt c/o n/v; pca in use for pain; ambulating in hallway; incisions c/d/i       Goal: Adult Individualization and Mutuality  Outcome: Ongoing (interventions implemented as appropriate)  Goal: Discharge Needs Assessment  Outcome: Ongoing (interventions implemented as appropriate)    Problem: Perioperative Period (Adult)  Goal: Signs and Symptoms of Listed Potential Problems Will be Absent or Manageable (Perioperative Period)  Outcome: Ongoing (interventions implemented as appropriate)

## 2017-06-09 VITALS
OXYGEN SATURATION: 97 % | WEIGHT: 207 LBS | SYSTOLIC BLOOD PRESSURE: 131 MMHG | DIASTOLIC BLOOD PRESSURE: 87 MMHG | RESPIRATION RATE: 16 BRPM | HEIGHT: 63 IN | TEMPERATURE: 97.7 F | HEART RATE: 74 BPM | BODY MASS INDEX: 36.68 KG/M2

## 2017-06-09 PROBLEM — E87.6 HYPOKALEMIA DUE TO INADEQUATE POTASSIUM INTAKE: Status: ACTIVE | Noted: 2017-06-08

## 2017-06-09 LAB
ALBUMIN SERPL-MCNC: 3.7 G/DL (ref 3.5–5)
ALBUMIN/GLOB SERPL: 1.2 G/DL (ref 1.1–2.5)
ALP SERPL-CCNC: 46 U/L (ref 24–120)
ALT SERPL W P-5'-P-CCNC: 188 U/L (ref 0–54)
ANION GAP SERPL CALCULATED.3IONS-SCNC: 11 MMOL/L (ref 4–13)
AST SERPL-CCNC: 148 U/L (ref 7–45)
BILIRUB SERPL-MCNC: 0.7 MG/DL (ref 0.1–1)
BUN BLD-MCNC: 7 MG/DL (ref 5–21)
BUN/CREAT SERPL: 10.6 (ref 7–25)
CALCIUM SPEC-SCNC: 8.6 MG/DL (ref 8.4–10.4)
CHLORIDE SERPL-SCNC: 101 MMOL/L (ref 98–110)
CO2 SERPL-SCNC: 31 MMOL/L (ref 24–31)
CREAT BLD-MCNC: 0.66 MG/DL (ref 0.5–1.4)
GFR SERPL CREATININE-BSD FRML MDRD: 96 ML/MIN/1.73
GLOBULIN UR ELPH-MCNC: 3.1 GM/DL
GLUCOSE BLD-MCNC: 111 MG/DL (ref 70–100)
POTASSIUM BLD-SCNC: 2.9 MMOL/L (ref 3.5–5.3)
POTASSIUM BLD-SCNC: 3 MMOL/L (ref 3.5–5.3)
POTASSIUM BLD-SCNC: 3.5 MMOL/L (ref 3.5–5.3)
PROT SERPL-MCNC: 6.8 G/DL (ref 6.3–8.7)
SODIUM BLD-SCNC: 143 MMOL/L (ref 135–145)

## 2017-06-09 PROCEDURE — 25010000002 ONDANSETRON PER 1 MG: Performed by: SURGERY

## 2017-06-09 PROCEDURE — 25010000003 POTASSIUM CHLORIDE 10 MEQ/100ML SOLUTION: Performed by: SURGERY

## 2017-06-09 PROCEDURE — 84132 ASSAY OF SERUM POTASSIUM: CPT | Performed by: SURGERY

## 2017-06-09 PROCEDURE — 80053 COMPREHEN METABOLIC PANEL: CPT | Performed by: SURGERY

## 2017-06-09 RX ORDER — POTASSIUM CHLORIDE 750 MG/1
10 CAPSULE, EXTENDED RELEASE ORAL DAILY
Qty: 10 CAPSULE | Refills: 0 | Status: SHIPPED | OUTPATIENT
Start: 2017-06-09 | End: 2017-06-13

## 2017-06-09 RX ORDER — POTASSIUM CHLORIDE 7.45 MG/ML
10 INJECTION INTRAVENOUS
Status: COMPLETED | OUTPATIENT
Start: 2017-06-09 | End: 2017-06-09

## 2017-06-09 RX ORDER — ONDANSETRON 4 MG/1
4 TABLET, ORALLY DISINTEGRATING ORAL EVERY 8 HOURS PRN
Qty: 30 TABLET | Refills: 0 | Status: SHIPPED | OUTPATIENT
Start: 2017-06-09 | End: 2017-07-11

## 2017-06-09 RX ORDER — POTASSIUM CHLORIDE 750 MG/1
20 CAPSULE, EXTENDED RELEASE ORAL ONCE
Status: COMPLETED | OUTPATIENT
Start: 2017-06-09 | End: 2017-06-09

## 2017-06-09 RX ORDER — POTASSIUM CHLORIDE 7.45 MG/ML
10 INJECTION INTRAVENOUS
Status: DISCONTINUED | OUTPATIENT
Start: 2017-06-09 | End: 2017-06-09

## 2017-06-09 RX ORDER — POTASSIUM CHLORIDE 7.45 MG/ML
10 INJECTION INTRAVENOUS ONCE
Status: COMPLETED | OUTPATIENT
Start: 2017-06-09 | End: 2017-06-09

## 2017-06-09 RX ADMIN — POTASSIUM PHOSPHATE, MONOBASIC 500 MG: 500 TABLET, SOLUBLE ORAL at 07:34

## 2017-06-09 RX ADMIN — POTASSIUM CHLORIDE 20 MEQ: 750 CAPSULE, EXTENDED RELEASE ORAL at 07:22

## 2017-06-09 RX ADMIN — POTASSIUM CHLORIDE 20 MEQ: 750 CAPSULE, EXTENDED RELEASE ORAL at 10:50

## 2017-06-09 RX ADMIN — HYDROCODONE BITARTRATE AND ACETAMINOPHEN 10 ML: 7.5; 325 SOLUTION ORAL at 13:29

## 2017-06-09 RX ADMIN — LISINOPRIL 10 MG: 10 TABLET ORAL at 08:15

## 2017-06-09 RX ADMIN — PANTOPRAZOLE SODIUM 40 MG: 40 INJECTION, POWDER, FOR SOLUTION INTRAVENOUS at 05:56

## 2017-06-09 RX ADMIN — HYDROCODONE BITARTRATE AND ACETAMINOPHEN 10 ML: 7.5; 325 SOLUTION ORAL at 03:50

## 2017-06-09 RX ADMIN — HYDROCODONE BITARTRATE AND ACETAMINOPHEN 10 ML: 7.5; 325 SOLUTION ORAL at 08:14

## 2017-06-09 RX ADMIN — POTASSIUM CHLORIDE 10 MEQ: 7.46 INJECTION, SOLUTION INTRAVENOUS at 11:38

## 2017-06-09 RX ADMIN — ONDANSETRON 4 MG: 2 SOLUTION INTRAMUSCULAR; INTRAVENOUS at 02:48

## 2017-06-09 RX ADMIN — POTASSIUM PHOSPHATE, MONOBASIC 500 MG: 500 TABLET, SOLUBLE ORAL at 11:38

## 2017-06-09 RX ADMIN — ONDANSETRON 4 MG: 2 SOLUTION INTRAMUSCULAR; INTRAVENOUS at 13:39

## 2017-06-09 RX ADMIN — POTASSIUM CHLORIDE 10 MEQ: 7.46 INJECTION, SOLUTION INTRAVENOUS at 10:32

## 2017-06-09 RX ADMIN — SODIUM CHLORIDE, POTASSIUM CHLORIDE, SODIUM LACTATE AND CALCIUM CHLORIDE 50 ML/HR: 600; 310; 30; 20 INJECTION, SOLUTION INTRAVENOUS at 02:48

## 2017-06-09 RX ADMIN — POTASSIUM CHLORIDE 10 MEQ: 7.46 INJECTION, SOLUTION INTRAVENOUS at 07:23

## 2017-06-09 RX ADMIN — ONDANSETRON 4 MG: 2 SOLUTION INTRAMUSCULAR; INTRAVENOUS at 07:22

## 2017-06-09 NOTE — PLAN OF CARE
Problem: Patient Care Overview (Adult)  Goal: Plan of Care Review  Outcome: Ongoing (interventions implemented as appropriate)    06/09/17 0335   Coping/Psychosocial Response Interventions   Plan Of Care Reviewed With patient   Patient Care Overview   Progress improving   Outcome Evaluation   Outcome Summary/Follow up Plan Medicated for pain x1; No other requests for nausea meds other than scheduled zofran        Goal: Adult Individualization and Mutuality  Outcome: Ongoing (interventions implemented as appropriate)  Goal: Discharge Needs Assessment  Outcome: Ongoing (interventions implemented as appropriate)    Problem: Perioperative Period (Adult)  Goal: Signs and Symptoms of Listed Potential Problems Will be Absent or Manageable (Perioperative Period)  Outcome: Ongoing (interventions implemented as appropriate)

## 2017-06-09 NOTE — DISCHARGE SUMMARY
"  Date of Discharge:  6/9/2017    Discharge Diagnosis: Body mass index 36.0-36.9, adult [Z68.36]  Pre-op exam [Z01.818]  Body mass index 36.0-36.9, adult [Z68.36]    Presenting Problem/History of Present Illness  Body mass index 36.0-36.9, adult [Z68.36]  Pre-op exam [Z01.818]  Body mass index 36.0-36.9, adult [Z68.36]       Hospital Course  Patient is a 47 y.o. female presented with s/p lap sleeve.  Postoperatively the patient was doing well in terms of her symptoms of nausea and vomiting.  She had mild nausea with mild episodes of vomiting initially after her surgery.  This improved on postop day 1 she checked a CMP which disclosed a low potassium.  She continued to ambulate and she was advanced to stage I diet.  On postop day 2 her nausea and vomiting subsided completely however she had potassium levels that were below normal limits after given potassium supplementation the previous day.  She continued to have potassium supplementation on postop day 2 and remained asymptomatic for hypokalemia.  Upon completion of the supplementation her potassium value returned to normal limits and she was discharged home with instructions to continue her potassium supplementation with the adjusted modifications provided.      Procedures Performed  Procedure(s):  GASTRIC SLEEVE LAPAROSCOPIC, HIATAL HERNIA REPAIR       Consults:   Consults     No orders found from 5/9/2017 to 6/8/2017.            Condition on Discharge:  Stable    Vital Signs  /87 (BP Location: Left arm, Patient Position: Sitting)  Pulse 74  Temp 97.7 °F (36.5 °C) (Oral)   Resp 16  Ht 63\" (160 cm)  Wt 207 lb (93.9 kg)  LMP Comment: X2 YEARS AGO  SpO2 97%  Breastfeeding? No  BMI 36.67 kg/m2    Physical Exam:  General Appearance: alert, appears stated age and cooperative  Abdomen: normal bowel sounds, no masses, no hepatomegaly, no splenomegaly, soft non-tender, no guarding and no rebound tenderness  Lab Results   Component Value Date    GLUCOSE 111 " (H) 06/09/2017    BUN 7 06/09/2017    CREATININE 0.66 06/09/2017    EGFRIFNONA 96 06/09/2017    BCR 10.6 06/09/2017    K 3.5 06/09/2017    CO2 31.0 06/09/2017    CALCIUM 8.6 06/09/2017    ALBUMIN 3.70 06/09/2017    LABIL2 1.2 06/09/2017     (H) 06/09/2017     (H) 06/09/2017     Discharge Disposition  Home or Self Care    Discharge Medications   Alona Owens   Home Medication Instructions JOHN:[id number]    Printed on:06/09/17 1810   Medication Information                      HYDROcodone-acetaminophen (HYCET) 7.5-325 MG/15ML solution  Take 10 mL by mouth Every 4 (Four) Hours As Needed for Moderate Pain (4-6) for up to 10 days.             lisinopril (PRINIVIL,ZESTRIL) 10 MG tablet  Take 10 mg by mouth daily.             omeprazole (PriLOSEC) 20 MG capsule  Take 20 mg by mouth daily.             potassium chloride (MICRO-K) 10 MEQ CR capsule  Take 1 capsule by mouth Daily for 4 days.                 Discharge Diet:   Diet Instructions     Diet:       Diet Texture / Consistency:  Bariatric   Select stage:  Stage 1 Clear Liq. Sugar Free (no carbonation, no straw)                 Activity at Discharge:   Activity Instructions     Ambulate at Least 4 Times Per Day, Increase Distance Daily           Discharge Activity: Driving Restriction       No Driving for 10 days and no longer taking narcotics.       No Lifting, Pushing, Pulling Tugging More Than 25 lbs. For 3 Weeks       No lifting greater than 15 pounds for 4 weeks                 Follow-up Appointments  Future Appointments  Date Time Provider Department Center   6/13/2017 1:15 PM Andrews Sanchez MD [facility] None   7/11/2017 1:30 PM Andrews Sanchez MD [facility] None   1/3/2018 8:00 AM Ho Yoder MD [facility] None     Additional Instructions for the Follow-ups that You Need to Schedule     Bathing Instructions    As directed    Patient May Shower on Post-Op Day 2.  No Tub Bath for 2 Weeks.       Follow Up With Surgeon in 1-2 Weeks    As  directed    Follow Up Details:  1-2 Weeks       Potassium    Jun 14, 2017 (Approximate)                  Test Results Pending at Discharge   Order Current Status    Tissue Exam In process           Andrews Sanchez MD  06/09/17  2:52 PM

## 2017-06-09 NOTE — PLAN OF CARE
Problem: Patient Care Overview (Adult)  Goal: Plan of Care Review  Outcome: Ongoing (interventions implemented as appropriate)  Potassium 3.0 this AM, K run X1 and PO K this AM, then K level 2.9, K runs X2 and additional PO K, awaiting next K level results, tolerating Stage 1 diet, VSS, medicated for pain and nausea X2, up ambulating in hallway, voiding, safety maintained    06/09/17 1420   Coping/Psychosocial Response Interventions   Plan Of Care Reviewed With patient   Patient Care Overview   Progress improving       Goal: Adult Individualization and Mutuality  Outcome: Ongoing (interventions implemented as appropriate)  Goal: Discharge Needs Assessment  Outcome: Ongoing (interventions implemented as appropriate)    Problem: Perioperative Period (Adult)  Goal: Signs and Symptoms of Listed Potential Problems Will be Absent or Manageable (Perioperative Period)  Outcome: Ongoing (interventions implemented as appropriate)

## 2017-06-10 LAB
CYTO UR: NORMAL
LAB AP CASE REPORT: NORMAL
LAB AP CLINICAL INFORMATION: NORMAL
Lab: NORMAL
PATH REPORT.FINAL DX SPEC: NORMAL
PATH REPORT.GROSS SPEC: NORMAL

## 2017-06-11 NOTE — PAYOR COMM NOTE
"[id number]  [facility] 6-9-17        Alona Salas (47 y.o. Female)     Date of Birth Social Security Number Address Home Phone MRN    [id number]  [id number] [place] [address] [phone] [id number]    Tenriism Marital Status          None        Admission Date Admission Type Admitting Provider Attending Provider Department, Room/Bed    6/7/17 Elective Andrews Sanchez MD  [facility] 3C, 365/1    Discharge Date Discharge Disposition Discharge Destination        6/9/2017 Home or Self Care             Attending Provider: (none)    Allergies:  No Known Allergies    Isolation:  None   Infection:  None   Code Status:  Prior    Ht:  63\" (160 cm)   Wt:  207 lb (93.9 kg)    Admission Cmt:  None   Principal Problem:  None                Active Insurance as of 6/7/2017     Primary Coverage     Payor Plan Insurance Group Employer/Plan Group    [id number]     Payor Plan Address Payor Plan Phone Number Effective From Effective To    [address]  9/25/2011     [place] [address]       Subscriber Name Subscriber Birth Date Member ID       ALONA SALAS 1970 [id number]           Secondary Coverage     Payor Plan Insurance Group Employer/Plan Group    [employer] [id number]     Payor Plan Address Payor Plan Phone Number Effective From Effective To    [address]  6/7/2017     [place] [address]       Subscriber Name Subscriber Birth Date Member ID       VIPUL SALAS 12/11/1975 [id number]                 Emergency Contacts      (Rel.) Home Phone Work Phone Mobile Phone    Vipul Salas (Spouse) [phone] -- [phone]               Discharge Summary      Andrews Sanchez MD at 6/9/2017  2:51 PM            Date of Discharge:  6/9/2017    Discharge Diagnosis: Body mass index 36.0-36.9, adult [Z68.36]  Pre-op exam [Z01.818]  Body mass index 36.0-36.9, adult [Z68.36]    Presenting Problem/History of Present Illness  Body mass index " "36.0-36.9, adult [Z68.36]  Pre-op exam [Z01.818]  Body mass index 36.0-36.9, adult [Z68.36]       Hospital Course  Patient is a 47 y.o. female presented with s/p lap sleeve.  Postoperatively the patient was doing well in terms of her symptoms of nausea and vomiting.  She had mild nausea with mild episodes of vomiting initially after her surgery.  This improved on postop day 1 she checked a CMP which disclosed a low potassium.  She continued to ambulate and she was advanced to stage I diet.  On postop day 2 her nausea and vomiting subsided completely however she had potassium levels that were below normal limits after given potassium supplementation the previous day.  She continued to have potassium supplementation on postop day 2 and remained asymptomatic for hypokalemia.  Upon completion of the supplementation her potassium value returned to normal limits and she was discharged home with instructions to continue her potassium supplementation with the adjusted modifications provided.      Procedures Performed  Procedure(s):  GASTRIC SLEEVE LAPAROSCOPIC, HIATAL HERNIA REPAIR       Consults:   Consults     No orders found from 5/9/2017 to 6/8/2017.            Condition on Discharge:  Stable    Vital Signs  /87 (BP Location: Left arm, Patient Position: Sitting)  Pulse 74  Temp 97.7 °F (36.5 °C) (Oral)   Resp 16  Ht 63\" (160 cm)  Wt 207 lb (93.9 kg)  LMP Comment: X2 YEARS AGO  SpO2 97%  Breastfeeding? No  BMI 36.67 kg/m2    Physical Exam:  General Appearance: alert, appears stated age and cooperative  Abdomen: normal bowel sounds, no masses, no hepatomegaly, no splenomegaly, soft non-tender, no guarding and no rebound tenderness  Lab Results   Component Value Date    GLUCOSE 111 (H) 06/09/2017    BUN 7 06/09/2017    CREATININE 0.66 06/09/2017    EGFRIFNONA 96 06/09/2017    BCR 10.6 06/09/2017    K 3.5 06/09/2017    CO2 31.0 06/09/2017    CALCIUM 8.6 06/09/2017    ALBUMIN 3.70 06/09/2017    LABIL2 1.2 " 06/09/2017     (H) 06/09/2017     (H) 06/09/2017     Discharge Disposition  Home or Self Care    Discharge Medications   Alona Owens   Home Medication Instructions JOHN:[id number]    Printed on:06/09/17 5736   Medication Information                      HYDROcodone-acetaminophen (HYCET) 7.5-325 MG/15ML solution  Take 10 mL by mouth Every 4 (Four) Hours As Needed for Moderate Pain (4-6) for up to 10 days.             lisinopril (PRINIVIL,ZESTRIL) 10 MG tablet  Take 10 mg by mouth daily.             omeprazole (PriLOSEC) 20 MG capsule  Take 20 mg by mouth daily.             potassium chloride (MICRO-K) 10 MEQ CR capsule  Take 1 capsule by mouth Daily for 4 days.                 Discharge Diet:   Diet Instructions     Diet:       Diet Texture / Consistency:  Bariatric   Select stage:  Stage 1 Clear Liq. Sugar Free (no carbonation, no straw)                 Activity at Discharge:   Activity Instructions     Ambulate at Least 4 Times Per Day, Increase Distance Daily           Discharge Activity: Driving Restriction       No Driving for 10 days and no longer taking narcotics.       No Lifting, Pushing, Pulling Tugging More Than 25 lbs. For 3 Weeks       No lifting greater than 15 pounds for 4 weeks                 Follow-up Appointments  Future Appointments  Date Time Provider Department Center   6/13/2017 1:15 PM Andrews Sanchez MD [facility] None   7/11/2017 1:30 PM Andrews Sanchez MD [facility] None   1/3/2018 8:00 AM Ho Yoder MD [facility] None     Additional Instructions for the Follow-ups that You Need to Schedule     Bathing Instructions    As directed    Patient May Shower on Post-Op Day 2.  No Tub Bath for 2 Weeks.       Follow Up With Surgeon in 1-2 Weeks    As directed    Follow Up Details:  1-2 Weeks       Potassium    Jun 14, 2017 (Approximate)                  Test Results Pending at Discharge   Order Current Status    Tissue Exam In process           Andrews Sanchez  MD  06/09/17  2:52 PM             Electronically signed by Andrews Sanchez MD at 6/9/2017  2:57 PM

## 2017-06-13 ENCOUNTER — LAB (OUTPATIENT)
Dept: LAB | Facility: HOSPITAL | Age: 47
End: 2017-06-13
Attending: SURGERY

## 2017-06-13 ENCOUNTER — OFFICE VISIT (OUTPATIENT)
Dept: BARIATRICS/WEIGHT MGMT | Facility: CLINIC | Age: 47
End: 2017-06-13

## 2017-06-13 VITALS
SYSTOLIC BLOOD PRESSURE: 142 MMHG | DIASTOLIC BLOOD PRESSURE: 90 MMHG | HEART RATE: 79 BPM | WEIGHT: 199 LBS | TEMPERATURE: 99.1 F | HEIGHT: 63 IN | OXYGEN SATURATION: 100 % | BODY MASS INDEX: 35.26 KG/M2

## 2017-06-13 DIAGNOSIS — Z98.84 STATUS POST LAPAROSCOPIC SLEEVE GASTRECTOMY: ICD-10-CM

## 2017-06-13 DIAGNOSIS — E87.6 HYPOKALEMIA DUE TO INADEQUATE POTASSIUM INTAKE: ICD-10-CM

## 2017-06-13 DIAGNOSIS — Z98.84 STATUS POST LAPAROSCOPIC SLEEVE GASTRECTOMY: Primary | ICD-10-CM

## 2017-06-13 LAB — POTASSIUM BLD-SCNC: 3.8 MMOL/L (ref 3.5–5.3)

## 2017-06-13 PROCEDURE — 36415 COLL VENOUS BLD VENIPUNCTURE: CPT

## 2017-06-13 PROCEDURE — 84132 ASSAY OF SERUM POTASSIUM: CPT | Performed by: SURGERY

## 2017-06-13 PROCEDURE — 99024 POSTOP FOLLOW-UP VISIT: CPT | Performed by: SURGERY

## 2017-06-13 NOTE — PROGRESS NOTES
"Subjective   Alona Owens is a 47 y.o. female.     Alona is post op one week from her laparoscopic sleeve gastrectomy.  She is currently on her stage II diet.  The patient's biggest complaint is constipation.  However she did have a bowel movement earlier today prior to our visit.  She stated she took milk of magnesia to help facilitate the bowel movement.  She denies dental pain, nausea vomiting.  She is tolerating the advancement of her diet to stage II without difficulty.    Review Of Systems:  General ROS: negative  Respiratory ROS: no cough, shortness of breath, or wheezing  Cardiovascular ROS: no chest pain or dyspnea on exertion  Gastrointestinal ROS: no abdominal pain, change in bowel habits, or black or bloody stools  positive for - constipation    The following portions of the patient's history were reviewed and updated as appropriate: allergies, current medications, past medical history, past surgical history and problem list.    Objective   /90 (BP Location: Right arm, Patient Position: Sitting, Cuff Size: Adult)  Pulse 79  Temp 99.1 °F (37.3 °C)  Ht 63\" (160 cm)  Wt 199 lb (90.3 kg)  SpO2 100%  BMI 35.25 kg/m2    General Appearance:  awake, alert, oriented, in no acute distress  Lungs:  Normal expansion.  Clear to auscultation.  No rales, rhonchi, or wheezing.  Heart:  Heart sounds are normal.  Regular rate and rhythm without murmur, gallop or rub.  Abdomen:  Soft, non-tender, normal bowel sounds; no bruits, organomegaly or masses.  Abnormal shape: obese  Extremities: Extremities warm to touch, pink, with no edema.  Wounds: clean, dry, intact    Assessment/Plan     Encounter Diagnoses   Name Primary?   • Status post laparoscopic sleeve gastrectomy Yes   • Body mass index 36.0-36.9, adult      1.  Status post lap scopic sleeve gastrectomy/1 week - the patient overall is doing well.  In terms of her constipation I recommended that she increase her fluid intake as well as considering dilute " prune juice and her diet.  If this does not help with her bowel movements we will then consider over-the-counter milk of magnesia.  The patient and I discussed their weight restrictions which is defined as avoid lifting greater than 15 lbs for at least 4 weeks to allow healing of her tissue.  I also discussed the avoidance of driving or operating a motor vehicle if she requires, needs taking pain medication, or has a distracting injury or pain because of the risk of injuring herself or others.  She is to follow-up with our program in 3 weeks or as needed.    06/13/17  1:53 PM  Patient Care Team:  Ludwig Hayes MD as PCP - General  Ludwig Hayes MD as PCP - Family Medicine  DELON Burgess as Referring Physician (Family Medicine)  Ho Yoder MD as Cardiologist (Cardiology)  Andrews Sanchez MD FACS

## 2017-06-16 ENCOUNTER — RESULTS ENCOUNTER (OUTPATIENT)
Dept: BARIATRICS/WEIGHT MGMT | Facility: CLINIC | Age: 47
End: 2017-06-16

## 2017-06-16 DIAGNOSIS — E66.01 MORBID OBESITY DUE TO EXCESS CALORIES (HCC): ICD-10-CM

## 2017-06-27 ENCOUNTER — TELEPHONE (OUTPATIENT)
Dept: BARIATRICS/WEIGHT MGMT | Facility: CLINIC | Age: 47
End: 2017-06-27

## 2017-06-27 NOTE — TELEPHONE ENCOUNTER
Patient called would like her Potassium labs sent to Dr Hayes PCP.  With an order to discontinue her Potassium.     Per Dr Sanchez to fax over recent labs and office note.  Pcp to determine if Potassium is needed.

## 2017-07-11 ENCOUNTER — OFFICE VISIT (OUTPATIENT)
Dept: BARIATRICS/WEIGHT MGMT | Facility: CLINIC | Age: 47
End: 2017-07-11

## 2017-07-11 VITALS
HEIGHT: 63 IN | BODY MASS INDEX: 32.43 KG/M2 | OXYGEN SATURATION: 100 % | RESPIRATION RATE: 16 BRPM | HEART RATE: 74 BPM | SYSTOLIC BLOOD PRESSURE: 130 MMHG | DIASTOLIC BLOOD PRESSURE: 86 MMHG | WEIGHT: 183 LBS

## 2017-07-11 DIAGNOSIS — Z98.84 STATUS POST LAPAROSCOPIC SLEEVE GASTRECTOMY: Primary | ICD-10-CM

## 2017-07-11 DIAGNOSIS — E66.9 OBESITY, CLASS I, BMI 30-34.9: ICD-10-CM

## 2017-07-11 PROCEDURE — 99024 POSTOP FOLLOW-UP VISIT: CPT | Performed by: SURGERY

## 2017-07-11 NOTE — PROGRESS NOTES
"Subjective   Alona Owens is a 47 y.o. female.     Alona is post op one month from her Lap Sleeve gastrectomy.  She is currently on her regular diet.  She stated she is doing well.  She had constipation in the past which has resolved.  She denies pain, N/V.  She has been tolerating the advancement of her diet as well.     Review Of Systems:  Respiratory ROS: no cough, shortness of breath, or wheezing  Cardiovascular ROS: no chest pain or dyspnea on exertion  Gastrointestinal ROS: no abdominal pain, change in bowel habits, or black or bloody stools    The following portions of the patient's history were reviewed and updated as appropriate: allergies, current medications, past medical history, past surgical history and problem list.    Objective   /86 (BP Location: Left arm, Patient Position: Sitting, Cuff Size: Adult)  Pulse 74  Resp 16  Ht 63\" (160 cm)  Wt 183 lb (83 kg)  SpO2 100%  BMI 32.42 kg/m2    General Appearance:  awake, alert, oriented, in no acute distress  Lungs:  Normal expansion.  Clear to auscultation.  No rales, rhonchi, or wheezing.  Heart:  Heart sounds are normal.  Regular rate and rhythm without murmur, gallop or rub.  Abdomen:  Soft, non-tender, normal bowel sounds; no bruits, organomegaly or masses.  Extremities: Extremities warm to touch, pink, with no edema.  Wounds: clean, dry, intact    Assessment/Plan     Encounter Diagnoses   Name Primary?   • Status post laparoscopic sleeve gastrectomy Yes   • Obesity, Class I, BMI 30-34.9      1.  Status post 1 month from a laparoscopic sleeve gastrectomy - patient is doing well overall.  She is tolerating her diet denying nausea or vomiting.  Her constipation has been resolved after taking over-the-counter stool softeners.  I have recommended that she increase the fiber in her diet with fruits and vegetables.  I have also recommended that she may now increase her physical activity as tolerated.  She is to follow-up with our program in 2 " months or as needed.    07/11/17  1:53 PM  Patient Care Team:  Ludwig Hayes MD as PCP - General  Ludwig Hayes MD as PCP - Family Medicine  DELON Burgess as Referring Physician (Family Medicine)  Ho Yoder MD as Cardiologist (Cardiology)  Andrews Sanchez MD FACS

## 2017-07-14 ENCOUNTER — RESULTS ENCOUNTER (OUTPATIENT)
Dept: BARIATRICS/WEIGHT MGMT | Facility: CLINIC | Age: 47
End: 2017-07-14

## 2017-07-14 DIAGNOSIS — E66.01 MORBID OBESITY DUE TO EXCESS CALORIES (HCC): ICD-10-CM

## 2017-08-11 ENCOUNTER — RESULTS ENCOUNTER (OUTPATIENT)
Dept: BARIATRICS/WEIGHT MGMT | Facility: CLINIC | Age: 47
End: 2017-08-11

## 2017-08-11 DIAGNOSIS — E66.01 MORBID OBESITY DUE TO EXCESS CALORIES (HCC): ICD-10-CM

## 2017-09-08 ENCOUNTER — RESULTS ENCOUNTER (OUTPATIENT)
Dept: BARIATRICS/WEIGHT MGMT | Facility: CLINIC | Age: 47
End: 2017-09-08

## 2017-09-08 DIAGNOSIS — E66.01 MORBID OBESITY DUE TO EXCESS CALORIES (HCC): ICD-10-CM

## 2017-09-12 ENCOUNTER — OFFICE VISIT (OUTPATIENT)
Dept: BARIATRICS/WEIGHT MGMT | Facility: CLINIC | Age: 47
End: 2017-09-12

## 2017-09-12 VITALS
DIASTOLIC BLOOD PRESSURE: 78 MMHG | OXYGEN SATURATION: 100 % | BODY MASS INDEX: 30.19 KG/M2 | HEART RATE: 64 BPM | WEIGHT: 170.4 LBS | SYSTOLIC BLOOD PRESSURE: 131 MMHG | TEMPERATURE: 98 F | HEIGHT: 63 IN

## 2017-09-12 DIAGNOSIS — Z98.84 STATUS POST LAPAROSCOPIC SLEEVE GASTRECTOMY: Primary | ICD-10-CM

## 2017-09-12 DIAGNOSIS — E66.9 OBESITY, CLASS I, BMI 30-34.9: ICD-10-CM

## 2017-09-12 PROCEDURE — 99212 OFFICE O/P EST SF 10 MIN: CPT | Performed by: SURGERY

## 2017-09-12 NOTE — PROGRESS NOTES
"Subjective   Alona Owens is a 47 y.o. female.     Alona is post op 3 months from her laparoscopic sleeve gastrectomy.  She is currently on her regular diet.  She states she is doing well.  She denies complaints of reflux, abdominal pain or constipation.  She stated consuming approximately 64 ounces of fluid daily.  She is been eating approximately 4 meals with portion sizes of less than a half a cup regularly.  She also has increased her activity to 45 minutes of exercise daily.    Review Of Systems:  General ROS: negative  Gastrointestinal ROS: no abdominal pain, change in bowel habits, or black or bloody stools    The following portions of the patient's history were reviewed and updated as appropriate: allergies, current medications, past medical history, past surgical history and problem list.    Objective   /78 (BP Location: Right arm, Patient Position: Sitting, Cuff Size: Adult)  Pulse 64  Temp 98 °F (36.7 °C)  Ht 63\" (160 cm)  Wt 170 lb 6.4 oz (77.3 kg)  SpO2 100%  BMI 30.19 kg/m2    General Appearance:  awake, alert, oriented, in no acute distress  Wounds: clean, dry, intact    Assessment/Plan     Encounter Diagnoses   Name Primary?   • Status post laparoscopic sleeve gastrectomy Yes   • Obesity, Class I, BMI 30-34.9      1.  Status post 3 months from her laparoscopic sleeve gastrectomy.  The patient is following the appropriate behavior recommended by her program for continued weight loss.  She was distracted to maintain adequate hydration to decrease risk of thromboembolic events.  She is to continue exercise as tolerated and follow-up with our program in 3 months or as needed.    09/12/17  2:30 PM  Patient Care Team:  Ludwig Hayes MD as PCP - General  Ludwig Hayes MD as PCP - Family Medicine  DELON Burgess as Referring Physician (Family Medicine)  Ho Yoder MD as Cardiologist (Cardiology)  Andrews Sanchez MD FACS    "

## 2017-10-06 ENCOUNTER — RESULTS ENCOUNTER (OUTPATIENT)
Dept: BARIATRICS/WEIGHT MGMT | Facility: CLINIC | Age: 47
End: 2017-10-06

## 2017-10-06 DIAGNOSIS — E66.01 MORBID OBESITY DUE TO EXCESS CALORIES (HCC): ICD-10-CM

## 2017-11-03 ENCOUNTER — RESULTS ENCOUNTER (OUTPATIENT)
Dept: BARIATRICS/WEIGHT MGMT | Facility: CLINIC | Age: 47
End: 2017-11-03

## 2017-11-03 DIAGNOSIS — E66.01 MORBID OBESITY DUE TO EXCESS CALORIES (HCC): ICD-10-CM

## 2017-11-21 RX ORDER — POTASSIUM CHLORIDE 750 MG/1
CAPSULE, EXTENDED RELEASE ORAL
Qty: 10 CAPSULE | Refills: 0 | OUTPATIENT
Start: 2017-11-21

## 2017-12-01 ENCOUNTER — RESULTS ENCOUNTER (OUTPATIENT)
Dept: BARIATRICS/WEIGHT MGMT | Facility: CLINIC | Age: 47
End: 2017-12-01

## 2017-12-01 DIAGNOSIS — E66.01 MORBID OBESITY DUE TO EXCESS CALORIES (HCC): ICD-10-CM

## 2017-12-05 ENCOUNTER — OFFICE VISIT (OUTPATIENT)
Dept: BARIATRICS/WEIGHT MGMT | Facility: CLINIC | Age: 47
End: 2017-12-05

## 2017-12-05 VITALS
OXYGEN SATURATION: 100 % | HEIGHT: 63 IN | DIASTOLIC BLOOD PRESSURE: 88 MMHG | WEIGHT: 168 LBS | TEMPERATURE: 97.8 F | SYSTOLIC BLOOD PRESSURE: 151 MMHG | HEART RATE: 74 BPM | BODY MASS INDEX: 29.77 KG/M2

## 2017-12-05 DIAGNOSIS — I10 ESSENTIAL HYPERTENSION: ICD-10-CM

## 2017-12-05 DIAGNOSIS — Z98.84 STATUS POST LAPAROSCOPIC SLEEVE GASTRECTOMY: Primary | ICD-10-CM

## 2017-12-05 DIAGNOSIS — E55.9 VITAMIN D DEFICIENCY: ICD-10-CM

## 2017-12-05 DIAGNOSIS — Z13.21 SCREENING FOR MALNUTRITION: ICD-10-CM

## 2017-12-05 PROBLEM — E66.9 OBESITY, CLASS I, BMI 30-34.9: Status: RESOLVED | Noted: 2017-07-11 | Resolved: 2017-12-05

## 2017-12-05 PROCEDURE — 99213 OFFICE O/P EST LOW 20 MIN: CPT | Performed by: NURSE PRACTITIONER

## 2017-12-05 RX ORDER — CHOLECALCIFEROL (VITAMIN D3) 125 MCG
CAPSULE ORAL WEEKLY
COMMUNITY

## 2017-12-05 NOTE — PATIENT INSTRUCTIONS
Patient is doing well overall.   Lab work will be drawn today. Results will be called to you once everything is completed.    Goals: continue positive lifestyle changes with diet and exercise.   Handouts provided on 6 month post op expectations, support group meetings, and 5 1/2 cup meals per day.   Patient will follow up in 6 months and will call the office if needs anything prior to that appointment.

## 2017-12-05 NOTE — PROGRESS NOTES
"Subjective   Alona Owens is a 47 y.o. female.     History of Present Illness   Alona Owens is post sleeve bariatric surgery with Dr. Sanchez 6/7/17. This is her 6 month follow-up. Patient is able to get 60 ounces of water in per day. Patient is able to get 60-80  grams of protein in per day. Patient is taking a multiple vitamin per day. Patient is taking Calcium with vitamin D supplement 2 times daily. Patient is eating 5 meals per day at 1/2 cup in volume. Patient is exercising by walking four days per week for 30 minutes. Patient does not have any complaints of reflux, dysphagia, N/V, or abdominal pain. She has been on omeprazole, but is going to try and stop it and see if she does not need it any longer. No hx of Wolf's esophagus.   Vitals:    12/05/17 1126   BP: 151/88   BP Location: Right arm   Patient Position: Sitting   Cuff Size: Adult   Pulse: 74   Temp: 97.8 °F (36.6 °C)   SpO2: 100%   Weight: 76.2 kg (168 lb)   Height: 160 cm (63\")         The following portions of the patient's history were reviewed and updated as appropriate: allergies, current medications, past family history, past medical history, past social history, past surgical history and problem list.    Review of Systems   Constitutional: Negative for activity change, appetite change and fatigue.   HENT: Negative.    Eyes: Negative.    Respiratory: Negative.  Negative for apnea, cough, chest tightness and shortness of breath.    Cardiovascular: Negative.  Negative for chest pain, palpitations and leg swelling.   Gastrointestinal: Negative.  Negative for abdominal pain, anal bleeding, constipation, nausea and vomiting.   Endocrine: Negative.         Hair loss   Genitourinary: Negative.    Musculoskeletal: Negative.  Negative for arthralgias and back pain.   Skin: Negative.    Allergic/Immunologic: Negative.    Neurological: Negative.  Negative for seizures and syncope.   Hematological: Negative.  Does not bruise/bleed easily. "   Psychiatric/Behavioral: Negative.  Negative for dysphoric mood, self-injury and sleep disturbance.       Objective   Physical Exam   Constitutional: She is oriented to person, place, and time. Vital signs are normal. She appears well-developed and well-nourished. She is cooperative. No distress.   HENT:   Head: Normocephalic and atraumatic.   Nose: Nose normal.   Mouth/Throat: Oropharynx is clear and moist. No oropharyngeal exudate or tonsillar abscesses.   Eyes: Conjunctivae, EOM and lids are normal. Pupils are equal, round, and reactive to light. Right eye exhibits no discharge. Left eye exhibits no discharge.   Neck: Trachea normal. Neck supple. No JVD present. Carotid bruit is not present. No rigidity. No tracheal deviation present. No thyromegaly present.   Cardiovascular: Normal rate, regular rhythm, S1 normal, S2 normal and normal heart sounds.    Pulmonary/Chest: Effort normal and breath sounds normal. No stridor. No respiratory distress. She has no wheezes. She has no rales.   Abdominal: Soft. Bowel sounds are normal. She exhibits no distension. There is no tenderness.   Healed scars noted   Musculoskeletal: She exhibits no edema.        Right shoulder: She exhibits normal strength.   Lymphadenopathy:     She has no cervical adenopathy.   Neurological: She is alert and oriented to person, place, and time. She has normal strength. No cranial nerve deficit.   Skin: Skin is warm, dry and intact. No rash noted.   Psychiatric: She has a normal mood and affect. Her speech is normal and behavior is normal.   Alert and oriented x 3   Vitals reviewed.      Assessment/Plan   Alona was seen today for post-op, nutrition counseling and weight loss.    Diagnoses and all orders for this visit:    Status post laparoscopic sleeve gastrectomy  -     CBC (No Diff)  -     Comprehensive Metabolic Panel  -     Copper, Serum  -     Ferritin  -     Folate  -     Iron Profile  -     Magnesium  -     Phosphorus  -     Vitamin  A  -     Vitamin B1, Whole Blood  -     Vitamin B12  -     Vitamin D 25 Hydroxy  -     Vitamin E  -     Vitamin K1  -     Zinc    BMI 29.0-29.9,adult  -     CBC (No Diff)  -     Comprehensive Metabolic Panel  -     Copper, Serum  -     Ferritin  -     Folate  -     Iron Profile  -     Magnesium  -     Phosphorus  -     Vitamin A  -     Vitamin B1, Whole Blood  -     Vitamin B12  -     Vitamin D 25 Hydroxy  -     Vitamin E  -     Vitamin K1  -     Zinc    Vitamin D deficiency  -     CBC (No Diff)  -     Comprehensive Metabolic Panel  -     Copper, Serum  -     Ferritin  -     Folate  -     Iron Profile  -     Magnesium  -     Phosphorus  -     Vitamin A  -     Vitamin B1, Whole Blood  -     Vitamin B12  -     Vitamin D 25 Hydroxy  -     Vitamin E  -     Vitamin K1  -     Zinc    Essential hypertension  -     CBC (No Diff)  -     Comprehensive Metabolic Panel  -     Copper, Serum  -     Ferritin  -     Folate  -     Iron Profile  -     Magnesium  -     Phosphorus  -     Vitamin A  -     Vitamin B1, Whole Blood  -     Vitamin B12  -     Vitamin D 25 Hydroxy  -     Vitamin E  -     Vitamin K1  -     Zinc    Screening for malnutrition  -     CBC (No Diff)  -     Comprehensive Metabolic Panel  -     Copper, Serum  -     Ferritin  -     Folate  -     Iron Profile  -     Magnesium  -     Phosphorus  -     Vitamin A  -     Vitamin B1, Whole Blood  -     Vitamin B12  -     Vitamin D 25 Hydroxy  -     Vitamin E  -     Vitamin K1  -     Zinc              Patient is doing well overall.   Lab work will be drawn today. Results will be called to you once everything is completed.    Goals: continue positive lifestyle changes with diet and exercise.   Handouts provided on 6 month post op expectations, support group meetings, and 5 1/2 cup meals per day.   Patient will follow up in 6 months and will call the office if needs anything prior to that appointment.

## 2017-12-13 ENCOUNTER — TELEPHONE (OUTPATIENT)
Dept: BARIATRICS/WEIGHT MGMT | Facility: CLINIC | Age: 47
End: 2017-12-13

## 2018-01-03 ENCOUNTER — OFFICE VISIT (OUTPATIENT)
Dept: CARDIOLOGY | Facility: CLINIC | Age: 48
End: 2018-01-03

## 2018-01-03 VITALS
OXYGEN SATURATION: 99 % | DIASTOLIC BLOOD PRESSURE: 88 MMHG | BODY MASS INDEX: 29.77 KG/M2 | SYSTOLIC BLOOD PRESSURE: 144 MMHG | HEIGHT: 63 IN | WEIGHT: 168 LBS | HEART RATE: 80 BPM

## 2018-01-03 DIAGNOSIS — I10 ESSENTIAL HYPERTENSION: Primary | ICD-10-CM

## 2018-01-03 DIAGNOSIS — Z01.818 PRE-OP EXAM: ICD-10-CM

## 2018-01-03 DIAGNOSIS — Z98.84 STATUS POST LAPAROSCOPIC SLEEVE GASTRECTOMY: ICD-10-CM

## 2018-01-03 DIAGNOSIS — E55.9 VITAMIN D DEFICIENCY: ICD-10-CM

## 2018-01-03 PROCEDURE — 93000 ELECTROCARDIOGRAM COMPLETE: CPT | Performed by: INTERNAL MEDICINE

## 2018-01-03 PROCEDURE — 99214 OFFICE O/P EST MOD 30 MIN: CPT | Performed by: INTERNAL MEDICINE

## 2018-01-03 RX ORDER — LISINOPRIL 10 MG/1
10 TABLET ORAL
Qty: 180 TABLET | Refills: 3 | Status: SHIPPED | OUTPATIENT
Start: 2018-01-03 | End: 2018-10-29 | Stop reason: SDUPTHER

## 2018-01-03 NOTE — PROGRESS NOTES
Alona wOens  [id number]  1970  47 y.o.  female    Referring Provider: Ludwig Hayes MD    Reason for Follow-up Visit: Essential Hypertension  Subjective    Overall feeling well   No chest pain or shortness of breath   No palpitations  No significant pedal edema  Compliant with medications and dietary advice  Good effort tolerance  No presyncope or syncope  Compliant with medications        History of present illness:  Alona Owens is a 47 y.o. yo female with history of Essential Hypertension    who presents today for   Chief Complaint   Patient presents with   • Hypertension     9 mo f/u - s/p gastric sleeve   .    History  Past Medical History:   Diagnosis Date   • Fatty liver    • Granuloma of skin determined by biopsy     Pyrogenic on left arch removed by Dr Freddy Mao on    • Heartburn     mild-she uses PPI's as needed     • Hyperlipidemia    • Hypertension    • Migraines     Occasionally    • Obesity    • PONV (postoperative nausea and vomiting)    ,   Past Surgical History:   Procedure Laterality Date   • ABDOMINOPLASTY     • BREAST IMPLANT SURGERY     •  SECTION     • CHOLECYSTECTOMY     • ENDOMETRIAL ABLATION     • GASTRIC SLEEVE LAPAROSCOPIC N/A 2017    Procedure: GASTRIC SLEEVE LAPAROSCOPIC, HIATAL HERNIA REPAIR;  Surgeon: Andrews Sanchez MD;  Location: [facility];  Service:    • UPPER GASTROINTESTINAL ENDOSCOPY  2016    Dr Castaneda at [facility]    ,   Family History   Problem Relation Age of Onset   • Obesity Mother    • Hypertension Mother    • No Known Problems Father    ,   Social History   Substance Use Topics   • Smoking status: Never Smoker   • Smokeless tobacco: Never Used   • Alcohol use No   ,     Medications  Current Outpatient Prescriptions   Medication Sig Dispense Refill   • BIOTIN PO Take  by mouth.     • Calcium-Phosphorus-Vitamin D (CITRACAL +D3 PO) Take  by mouth.     • lisinopril (PRINIVIL,ZESTRIL) 10 MG tablet Take 1  "tablet by mouth 2 (Two) Times a Day. 180 tablet 3   • Multiple Vitamins-Minerals (CENTRUM ADULTS PO) Take  by mouth.     • omeprazole (PriLOSEC) 20 MG capsule Take 20 mg by mouth daily.     • vitamin B-12 (CYANOCOBALAMIN) 500 MCG tablet Take  by mouth 1 (One) Time Per Week.       No current facility-administered medications for this visit.        Allergies:  Review of patient's allergies indicates no known allergies.    Review of Systems  Review of Systems   Constitution: Negative.   HENT: Negative.    Eyes: Negative.    Cardiovascular: Negative for chest pain, claudication, cyanosis, dyspnea on exertion, irregular heartbeat, leg swelling, near-syncope, orthopnea, palpitations, paroxysmal nocturnal dyspnea and syncope.   Respiratory: Negative.    Endocrine: Negative.    Hematologic/Lymphatic: Negative.    Skin: Negative.    Gastrointestinal: Negative for anorexia.   Genitourinary: Negative.    Neurological: Negative.    Psychiatric/Behavioral: Negative.        Objective     Physical Exam:  /88  Pulse 80  Ht 160 cm (63\")  Wt 76.2 kg (168 lb)  SpO2 99%  BMI 29.76 kg/m2  Physical Exam   Constitutional: She appears well-developed.   HENT:   Head: Normocephalic.   Neck: Normal carotid pulses and no JVD present. No tracheal tenderness present. Carotid bruit is not present. No tracheal deviation and no edema present.   Cardiovascular: Regular rhythm, normal heart sounds and normal pulses.    Pulmonary/Chest: Effort normal. No stridor.   Abdominal: Soft.   Neurological: She is alert. She has normal strength. No cranial nerve deficit or sensory deficit.   Skin: Skin is warm.   Psychiatric: She has a normal mood and affect. Her speech is normal and behavior is normal.       Results Review:       ECG 12 Lead  Date/Time: 1/3/2018 8:34 AM  Performed by: RUIZ KING  Authorized by: RUIZ KING   Comparison: compared with previous ECG from 5/19/2017  Similar to previous ECG  Rhythm: sinus rhythm  Rate: " normal  Conduction: conduction normal  ST Segments: ST segments normal  T Waves: T waves normal  QRS axis: normal and right  Clinical impression: abnormal ECG            Assessment/Plan   Patient Active Problem List   Diagnosis   • Vitamin D deficiency   • Essential hypertension   • Status post laparoscopic sleeve gastrectomy   • Hypokalemia due to inadequate potassium intake   • BMI 29.0-29.9,adult       No palpitations. No significant pedal edema. Compliant with medications and diet. Latest labs and medications reviewed.  Treadmill stress test normal  LV diastolic dysfunction on echo    Plan:    Close follow up with you as scheduled.  Intensive factor modifications.  See order list.    Keep LDL below 100 mg/dl. Monitor liver and renal functions.   Monitor CBC, CMP and Lipid Panel by primary   Counseled regarding disease appropriate diet, fluid, caffeine, stimulants and sodium intake as well as importance of compliance to diet, exercise and regular follow up.  Avoid NSAIDS and COX2 inhibitors. Use Acetaminophen PRN.  Monitor CBC, CMP and Lipid Panel by primary  Monitor for any signs of bleeding including red or dark stools. Fall precautions.   Patient is asked to monitor BP at home or work, several times per month and return with written values at next office visit.  The patient was advised that NSAID-type medications have three  very important potential side effects: cardiovascular complications, gastrointestinal irritation including hemorrhage and renal injuries.   He was asked not to take NSAIDS and use Tylenol instead.The patient expresses understanding of these issues and questions were answered.   Continue ECASA 81 mg daily regimen given risk factors and monitor for any signs of bleeding including red or dark stools. Fall precautions.    Low salt/ HTN/ Heart healthy carbohydrate restricted cardiac diet as applicable to this patient's current diagnoses.   This handout has relevant information regarding  "shopping for food, preparing meals, what to eat at restaurants, tracking of food intake, information regarding sodium intake and salt content, how to read food labels, knowing what to eat, tips reagarding physical activity, calorie count and calorie expenditure. What foods to avoid. Information regarding alcoholic drinks along with \"good\" and \"bad\" fats.  Relevant printed educational materials given pertinent to above diagnoses    Gave a copy of my notes and relevant tests/ prior ECG etc for the patient to review and follow specific advise and relevant findings if any, prognosis, along with my current and future plans.   Requested Prescriptions     Signed Prescriptions Disp Refills   • lisinopril (PRINIVIL,ZESTRIL) 10 MG tablet 180 tablet 3     Sig: Take 1 tablet by mouth 2 (Two) Times a Day.       Return in about 6 months (around 7/3/2018).                "

## 2018-06-28 ENCOUNTER — OFFICE VISIT (OUTPATIENT)
Dept: BARIATRICS/WEIGHT MGMT | Facility: CLINIC | Age: 48
End: 2018-06-28

## 2018-06-28 VITALS
WEIGHT: 190 LBS | HEART RATE: 77 BPM | TEMPERATURE: 98.3 F | DIASTOLIC BLOOD PRESSURE: 80 MMHG | OXYGEN SATURATION: 100 % | HEIGHT: 63 IN | BODY MASS INDEX: 33.66 KG/M2 | SYSTOLIC BLOOD PRESSURE: 135 MMHG

## 2018-06-28 DIAGNOSIS — E66.9 OBESITY, CLASS I, BMI 30-34.9: Primary | ICD-10-CM

## 2018-06-28 DIAGNOSIS — Z98.84 STATUS POST LAPAROSCOPIC SLEEVE GASTRECTOMY: ICD-10-CM

## 2018-06-28 PROCEDURE — 99213 OFFICE O/P EST LOW 20 MIN: CPT | Performed by: SURGERY

## 2018-06-28 RX ORDER — ALPRAZOLAM 0.25 MG/1
0.25 TABLET ORAL DAILY
COMMUNITY

## 2018-06-28 RX ORDER — ESCITALOPRAM OXALATE 5 MG/1
5 TABLET ORAL DAILY
COMMUNITY

## 2018-06-28 NOTE — PROGRESS NOTES
"Subjective   Alona Owens is a 48 y.o. female.     Alona is post op one year from her sleeve gastrectomy.  She is currently on her regular diet.  She states that she had been doing very well overall however had been placed on a blood pressure medication which she attributes to gaining weight.  She also noted swelling in her lower extremities during that time.  She has since stopped taking that medication and was placed on a diuretic with subsequent weight loss.  With regards to her eating behavior she is eating 4-5 meals a day with portion sizes of half a cup.  She incorporates protein with each of those meals and is consuming 64 ounces of fluid daily.  She exercises 2-3 times a week in the form of walking.    Review Of Systems:  General ROS: negative  Gastrointestinal ROS: no abdominal pain, change in bowel habits, or black or bloody stools    The following portions of the patient's history were reviewed and updated as appropriate: allergies, current medications, past medical history, past surgical history and problem list.    Objective   /80 (BP Location: Left arm, Patient Position: Sitting, Cuff Size: Adult)   Pulse 77   Temp 98.3 °F (36.8 °C)   Ht 160 cm (63\")   Wt 86.2 kg (190 lb)   SpO2 100%   BMI 33.66 kg/m²     General Appearance:  awake, alert, oriented, in no acute distress  Abdomen:  Soft, non-tender, normal bowel sounds; no bruits, organomegaly or masses.  Abnormal shape: obese  Extremities: Extremities warm to touch, pink, with no edema.  Wounds: clean, dry, intact    Assessment/Plan     Encounter Diagnoses   Name Primary?   • Obesity, Class I, BMI 30-34.9 Yes   • Status post laparoscopic sleeve gastrectomy        Alona Owens and SAUMYA discussed the importance of changing behavior for consistent and successful weight loss.  We first reviewed again the definition of a meal which is defined as portion sizes less than a half a cup and those portions incorporating a protein.  Specifically " the protein should fill half of that volume.  I also explained that she should attempt to consume 4 meals as defined above daily and to avoid snacking or grazing.  She should also be mindful of adequate hydration by consuming at least 64 oz of water daily and incorporation of a regular exercise regimen.   I discussed the importance of taking her multivitamins as directed.  She is to return to our office 3 months  or as needed and be reassessed for behavior and weight loss.    06/28/18  12:46 PM  Patient Care Team:  Ludwig Hayes MD as PCP - General  Ludwig Hayes MD as PCP - Family Medicine  DELON Burgess as Referring Physician (Family Medicine)  Ho Yoder MD as Cardiologist (Cardiology)  Andrews Sanchez MD FACS

## 2018-10-29 ENCOUNTER — TELEPHONE (OUTPATIENT)
Dept: BARIATRICS/WEIGHT MGMT | Facility: CLINIC | Age: 48
End: 2018-10-29

## 2018-10-29 ENCOUNTER — LAB (OUTPATIENT)
Dept: LAB | Facility: HOSPITAL | Age: 48
End: 2018-10-29
Attending: NURSE PRACTITIONER

## 2018-10-29 ENCOUNTER — OFFICE VISIT (OUTPATIENT)
Dept: CARDIOLOGY | Facility: CLINIC | Age: 48
End: 2018-10-29

## 2018-10-29 ENCOUNTER — OFFICE VISIT (OUTPATIENT)
Dept: BARIATRICS/WEIGHT MGMT | Facility: CLINIC | Age: 48
End: 2018-10-29

## 2018-10-29 VITALS
HEART RATE: 75 BPM | BODY MASS INDEX: 37.21 KG/M2 | SYSTOLIC BLOOD PRESSURE: 134 MMHG | OXYGEN SATURATION: 99 % | WEIGHT: 210 LBS | DIASTOLIC BLOOD PRESSURE: 88 MMHG | HEIGHT: 63 IN

## 2018-10-29 VITALS
WEIGHT: 211.6 LBS | SYSTOLIC BLOOD PRESSURE: 148 MMHG | TEMPERATURE: 98.9 F | HEART RATE: 77 BPM | DIASTOLIC BLOOD PRESSURE: 91 MMHG | HEIGHT: 63 IN | OXYGEN SATURATION: 100 % | BODY MASS INDEX: 37.49 KG/M2

## 2018-10-29 DIAGNOSIS — Z13.21 MALNUTRITION SCREEN: ICD-10-CM

## 2018-10-29 DIAGNOSIS — Z98.84 STATUS POST LAPAROSCOPIC SLEEVE GASTRECTOMY: ICD-10-CM

## 2018-10-29 DIAGNOSIS — E55.9 VITAMIN D DEFICIENCY: ICD-10-CM

## 2018-10-29 DIAGNOSIS — K21.9 GASTROESOPHAGEAL REFLUX DISEASE WITHOUT ESOPHAGITIS: ICD-10-CM

## 2018-10-29 DIAGNOSIS — I10 ESSENTIAL HYPERTENSION: Primary | ICD-10-CM

## 2018-10-29 DIAGNOSIS — E66.9 OBESITY, CLASS II, BMI 35-39.9: ICD-10-CM

## 2018-10-29 DIAGNOSIS — R63.5 WEIGHT GAIN: ICD-10-CM

## 2018-10-29 DIAGNOSIS — I10 ESSENTIAL HYPERTENSION: ICD-10-CM

## 2018-10-29 DIAGNOSIS — I51.89 DIASTOLIC DYSFUNCTION: ICD-10-CM

## 2018-10-29 DIAGNOSIS — E66.09 CLASS 2 OBESITY DUE TO EXCESS CALORIES WITHOUT SERIOUS COMORBIDITY WITH BODY MASS INDEX (BMI) OF 37.0 TO 37.9 IN ADULT: ICD-10-CM

## 2018-10-29 DIAGNOSIS — Z98.84 STATUS POST LAPAROSCOPIC SLEEVE GASTRECTOMY: Primary | ICD-10-CM

## 2018-10-29 PROBLEM — E87.6 HYPOKALEMIA DUE TO INADEQUATE POTASSIUM INTAKE: Status: RESOLVED | Noted: 2017-06-08 | Resolved: 2018-10-29

## 2018-10-29 LAB
25(OH)D3 SERPL-MCNC: 18.2 NG/ML (ref 30–100)
ALBUMIN SERPL-MCNC: 4.5 G/DL (ref 3.5–5)
ALBUMIN/GLOB SERPL: 1.4 G/DL (ref 1.1–2.5)
ALP SERPL-CCNC: 93 U/L (ref 24–120)
ALT SERPL W P-5'-P-CCNC: 45 U/L (ref 0–54)
ANION GAP SERPL CALCULATED.3IONS-SCNC: 13 MMOL/L (ref 4–13)
AST SERPL-CCNC: 39 U/L (ref 7–45)
BILIRUB SERPL-MCNC: 0.7 MG/DL (ref 0.1–1)
BUN BLD-MCNC: 16 MG/DL (ref 5–21)
BUN/CREAT SERPL: 32.7 (ref 7–25)
CALCIUM SPEC-SCNC: 9 MG/DL (ref 8.4–10.4)
CHLORIDE SERPL-SCNC: 99 MMOL/L (ref 98–110)
CO2 SERPL-SCNC: 28 MMOL/L (ref 24–31)
CREAT BLD-MCNC: 0.49 MG/DL (ref 0.5–1.4)
DEPRECATED RDW RBC AUTO: 39.3 FL (ref 40–54)
ERYTHROCYTE [DISTWIDTH] IN BLOOD BY AUTOMATED COUNT: 12.1 % (ref 12–15)
FERRITIN SERPL-MCNC: 25.6 NG/ML (ref 6.24–137)
FOLATE SERPL-MCNC: >20 NG/ML
GFR SERPL CREATININE-BSD FRML MDRD: 135 ML/MIN/1.73
GLOBULIN UR ELPH-MCNC: 3.2 GM/DL
GLUCOSE BLD-MCNC: 99 MG/DL (ref 70–100)
HCT VFR BLD AUTO: 39.9 % (ref 37–47)
HGB BLD-MCNC: 14.1 G/DL (ref 12–16)
IRON 24H UR-MRATE: 130 MCG/DL (ref 42–180)
IRON SATN MFR SERPL: 35 % (ref 20–45)
MAGNESIUM SERPL-MCNC: 1.9 MG/DL (ref 1.4–2.2)
MCH RBC QN AUTO: 31.1 PG (ref 28–32)
MCHC RBC AUTO-ENTMCNC: 35.3 G/DL (ref 33–36)
MCV RBC AUTO: 87.9 FL (ref 82–98)
PHOSPHATE SERPL-MCNC: 4.2 MG/DL (ref 2.5–4.5)
PLATELET # BLD AUTO: 277 10*3/MM3 (ref 130–400)
PMV BLD AUTO: 9.3 FL (ref 6–12)
POTASSIUM BLD-SCNC: 3.5 MMOL/L (ref 3.5–5.3)
PROT SERPL-MCNC: 7.7 G/DL (ref 6.3–8.7)
RBC # BLD AUTO: 4.54 10*6/MM3 (ref 4.2–5.4)
SODIUM BLD-SCNC: 140 MMOL/L (ref 135–145)
TIBC SERPL-MCNC: 368 MCG/DL (ref 225–420)
TSH SERPL DL<=0.05 MIU/L-ACNC: 0.34 MIU/ML (ref 0.47–4.68)
VIT B12 BLD-MCNC: 843 PG/ML (ref 239–931)
WBC NRBC COR # BLD: 7.92 10*3/MM3 (ref 4.8–10.8)

## 2018-10-29 PROCEDURE — 85027 COMPLETE CBC AUTOMATED: CPT | Performed by: NURSE PRACTITIONER

## 2018-10-29 PROCEDURE — 84597 ASSAY OF VITAMIN K: CPT | Performed by: NURSE PRACTITIONER

## 2018-10-29 PROCEDURE — 83540 ASSAY OF IRON: CPT | Performed by: NURSE PRACTITIONER

## 2018-10-29 PROCEDURE — 36415 COLL VENOUS BLD VENIPUNCTURE: CPT

## 2018-10-29 PROCEDURE — 99213 OFFICE O/P EST LOW 20 MIN: CPT | Performed by: NURSE PRACTITIONER

## 2018-10-29 PROCEDURE — 84100 ASSAY OF PHOSPHORUS: CPT | Performed by: NURSE PRACTITIONER

## 2018-10-29 PROCEDURE — 82728 ASSAY OF FERRITIN: CPT | Performed by: NURSE PRACTITIONER

## 2018-10-29 PROCEDURE — 83735 ASSAY OF MAGNESIUM: CPT | Performed by: NURSE PRACTITIONER

## 2018-10-29 PROCEDURE — 82607 VITAMIN B-12: CPT | Performed by: NURSE PRACTITIONER

## 2018-10-29 PROCEDURE — 93000 ELECTROCARDIOGRAM COMPLETE: CPT | Performed by: INTERNAL MEDICINE

## 2018-10-29 PROCEDURE — 99214 OFFICE O/P EST MOD 30 MIN: CPT | Performed by: INTERNAL MEDICINE

## 2018-10-29 PROCEDURE — 84446 ASSAY OF VITAMIN E: CPT | Performed by: NURSE PRACTITIONER

## 2018-10-29 PROCEDURE — 84443 ASSAY THYROID STIM HORMONE: CPT | Performed by: NURSE PRACTITIONER

## 2018-10-29 PROCEDURE — 84630 ASSAY OF ZINC: CPT | Performed by: NURSE PRACTITIONER

## 2018-10-29 PROCEDURE — 80053 COMPREHEN METABOLIC PANEL: CPT | Performed by: NURSE PRACTITIONER

## 2018-10-29 PROCEDURE — 84590 ASSAY OF VITAMIN A: CPT | Performed by: NURSE PRACTITIONER

## 2018-10-29 PROCEDURE — 82525 ASSAY OF COPPER: CPT | Performed by: NURSE PRACTITIONER

## 2018-10-29 PROCEDURE — 84425 ASSAY OF VITAMIN B-1: CPT | Performed by: NURSE PRACTITIONER

## 2018-10-29 PROCEDURE — 82746 ASSAY OF FOLIC ACID SERUM: CPT | Performed by: NURSE PRACTITIONER

## 2018-10-29 PROCEDURE — 83550 IRON BINDING TEST: CPT | Performed by: NURSE PRACTITIONER

## 2018-10-29 PROCEDURE — 82306 VITAMIN D 25 HYDROXY: CPT | Performed by: NURSE PRACTITIONER

## 2018-10-29 RX ORDER — LISINOPRIL 20 MG/1
20 TABLET ORAL
Qty: 180 TABLET | Refills: 3 | Status: SHIPPED | OUTPATIENT
Start: 2018-10-29 | End: 2020-06-15

## 2018-10-29 RX ORDER — ERGOCALCIFEROL 1.25 MG/1
50000 CAPSULE ORAL WEEKLY
Qty: 4 CAPSULE | Refills: 3 | Status: SHIPPED | OUTPATIENT
Start: 2018-10-29

## 2018-10-29 NOTE — PROGRESS NOTES
"Subjective   Alona Owens is a 48 y.o. female.     History of Present Illness   Alona Owens is post sleeve gastrectomy bariatric surgery. This is a 3 month follow-up after her annual exam with Dr. Sanchez. She has been having weight gain. Patient is able to get 80 ounces of water in per day. Patient is eating 5 meals per day and approximately 1 cup in volume. Patient is able to get 80 grams of protein in per day. Patient is taking a multiple vitamin per day. Patient is taking Calcium with vitamin D supplement 2-3 times daily. Patient is taking Vitamin B12 supplement orally once per week. Patient is exercising by walking once per week for 30 minutes. Patient does not have any complaints of reflux, dysphagia, N/V, or abdominal pain.   Vitals:    10/29/18 0952   BP: 148/91   BP Location: Right arm   Patient Position: Sitting   Cuff Size: Adult   Pulse: 77   Temp: 98.9 °F (37.2 °C)   SpO2: 100%   Weight: 96 kg (211 lb 9.6 oz)   Height: 160 cm (63\")         The following portions of the patient's history were reviewed and updated as appropriate: allergies, current medications, past family history, past medical history, past social history, past surgical history and problem list.    Review of Systems   Constitutional: Positive for unexpected weight change. Negative for activity change, appetite change and fatigue.   HENT: Negative.    Eyes: Negative.    Respiratory: Negative.  Negative for apnea, cough, chest tightness and shortness of breath.    Cardiovascular: Negative.  Negative for chest pain, palpitations and leg swelling.   Gastrointestinal: Negative.  Negative for abdominal pain, anal bleeding, constipation, nausea and vomiting.   Endocrine: Negative.    Genitourinary: Negative.    Musculoskeletal: Negative.  Negative for arthralgias and back pain.   Skin: Negative.    Allergic/Immunologic: Negative.    Neurological: Negative.  Negative for seizures and syncope.   Hematological: Negative.  Does not " bruise/bleed easily.   Psychiatric/Behavioral: Negative.  Negative for dysphoric mood, self-injury and sleep disturbance.       Objective   Physical Exam   Constitutional: She is oriented to person, place, and time. Vital signs are normal. She appears well-developed and well-nourished. She is cooperative. No distress.   HENT:   Head: Normocephalic and atraumatic.   Nose: Nose normal.   Mouth/Throat: Oropharynx is clear and moist. No oropharyngeal exudate or tonsillar abscesses.   Eyes: Pupils are equal, round, and reactive to light. Conjunctivae, EOM and lids are normal. Right eye exhibits no discharge. Left eye exhibits no discharge.   Neck: Trachea normal. Neck supple. No JVD present. Carotid bruit is not present. No neck rigidity. No tracheal deviation present. No thyromegaly present.   Cardiovascular: Normal rate, regular rhythm, S1 normal, S2 normal and normal heart sounds.    Pulmonary/Chest: Effort normal and breath sounds normal. No stridor. No respiratory distress. She has no wheezes. She has no rales.   Abdominal: Soft. Bowel sounds are normal. She exhibits no distension. There is no tenderness.   obese   Musculoskeletal: She exhibits no edema.        Right shoulder: She exhibits normal strength.   Lymphadenopathy:     She has no cervical adenopathy.   Neurological: She is alert and oriented to person, place, and time. She has normal strength. No cranial nerve deficit.   Skin: Skin is warm, dry and intact. No rash noted.   Psychiatric: She has a normal mood and affect. Her speech is normal and behavior is normal.   Alert and oriented x 3   Vitals reviewed.      Assessment/Plan   Alona was seen today for post-op, obesity, nutrition counseling and weight loss.    Diagnoses and all orders for this visit:    Status post laparoscopic sleeve gastrectomy  -     CBC (No Diff); Future  -     Copper, Serum; Future  -     Ferritin; Future  -     Folate; Future  -     Iron Profile; Future  -     Magnesium; Future  -      Phosphorus; Future  -     Vitamin A; Future  -     Vitamin B1, Whole Blood; Future  -     Vitamin B12; Future  -     Vitamin D 25 Hydroxy; Future  -     Vitamin E; Future  -     Vitamin K1; Future  -     Zinc; Future  -     TSH; Future    Obesity, Class II, BMI 35-39.9  -     CBC (No Diff); Future  -     TSH; Future    Essential hypertension  -     Comprehensive Metabolic Panel; Future    Vitamin D deficiency  -     Vitamin D 25 Hydroxy; Future    Weight gain  -     Comprehensive Metabolic Panel; Future  -     TSH; Future    Malnutrition screen  -     Comprehensive Metabolic Panel; Future  -     Copper, Serum; Future  -     Ferritin; Future  -     Folate; Future  -     Iron Profile; Future  -     Magnesium; Future  -     Phosphorus; Future  -     Vitamin A; Future  -     Vitamin B1, Whole Blood; Future  -     Vitamin B12; Future  -     Vitamin D 25 Hydroxy; Future  -     Vitamin E; Future  -     Vitamin K1; Future  -     Zinc; Future          Patient is struggling some since her last visit.  BMI is currently 37.5  with goal of getting BMI to 29 or less.  Lab work will be drawn today. Results will be called to you once everything is completed.    Goals: start positive lifestyle changes with diet and exercise. Reduce volume of meals to 1/2 cup or less.   Continue multiple vitamin, calcium, and B12 supplements as advised.   Handouts provided on post op expectations, 5 1/2 cup meals per day, and support group meeting dates/times.   Handout provided on extended liver shrinking diet to be done for two weeks.  Patient will follow up in 3 months and will call the office if needs anything prior to that appointment.

## 2018-10-29 NOTE — PATIENT INSTRUCTIONS
Patient is struggling some since her last visit.  BMI is currently 37.5  with goal of getting BMI to 29 or less.  Lab work will be drawn today. Results will be called to you once everything is completed.    Goals: start positive lifestyle changes with diet and exercise. Reduce volume of meals to 1/2 cup or less.   Continue multiple vitamin, calcium, and B12 supplements as advised.   Handouts provided on post op expectations, 5 1/2 cup meals per day, and support group meeting dates/times.   Handout provided on extended liver shrinking diet to be done for two weeks.  Patient will follow up in 3 months and will call the office if needs anything prior to that appointment.

## 2018-10-29 NOTE — PROGRESS NOTES
Alona Owens  [id number]  1970  48 y.o.  female    Referring Provider: Ludwig Hayes MD    Reason for Follow-up Visit: Essential Hypertension  S/P gastric bypass lost total 20 lbs net    Subjective    Overall feeling well   No chest pain or shortness of breath   No palpitations    No significant pedal edema  Compliant with medications and dietary advice  Good effort tolerance    No presyncope or syncope  Compliant with medications    Recently had fluid retention from Norvasc and was started on Lasix PRN      BP elevated at home       History of present illness:  Alona Owens is a 48 y.o. yo female with history of Essential Hypertension    who presents today for   Chief Complaint   Patient presents with   • Atrial Fibrillation     6 MON FU    .    History  Past Medical History:   Diagnosis Date   • Fatty liver    • Granuloma of skin determined by biopsy     Pyrogenic on left arch removed by Dr Freddy Mao on    • Heartburn     mild-she uses PPI's as needed     • Hyperlipidemia    • Hypertension    • Migraines     Occasionally    • Obesity    • PONV (postoperative nausea and vomiting)    ,   Past Surgical History:   Procedure Laterality Date   • ABDOMINOPLASTY     • BREAST IMPLANT SURGERY     •  SECTION     • CHOLECYSTECTOMY     • ENDOMETRIAL ABLATION     • GASTRIC SLEEVE LAPAROSCOPIC N/A 2017    Procedure: GASTRIC SLEEVE LAPAROSCOPIC, HIATAL HERNIA REPAIR;  Surgeon: Andrews Sanchez MD;  Location: [facility];  Service:    • UPPER GASTROINTESTINAL ENDOSCOPY  2016    Dr Castaneda at [facility]    ,   Family History   Problem Relation Age of Onset   • Obesity Mother    • Hypertension Mother    • No Known Problems Father    ,   Social History   Substance Use Topics   • Smoking status: Never Smoker   • Smokeless tobacco: Never Used   • Alcohol use No   ,     Medications  Current Outpatient Prescriptions   Medication Sig Dispense Refill   • ALPRAZolam (XANAX)  "0.25 MG tablet Take 0.25 mg by mouth Daily.     • BIOTIN PO Take  by mouth.     • Calcium-Phosphorus-Vitamin D (CITRACAL +D3 PO) Take  by mouth.     • escitalopram (LEXAPRO) 5 MG tablet Take 5 mg by mouth Daily.     • lisinopril (PRINIVIL,ZESTRIL) 20 MG tablet Take 1 tablet by mouth 2 (Two) Times a Day. 180 tablet 3   • Multiple Vitamins-Minerals (CENTRUM ADULTS PO) Take  by mouth.     • omeprazole (PriLOSEC) 20 MG capsule Take 20 mg by mouth daily.     • vitamin B-12 (CYANOCOBALAMIN) 500 MCG tablet Take  by mouth 1 (One) Time Per Week.       No current facility-administered medications for this visit.        Allergies:  Patient has no known allergies.    Review of Systems  Review of Systems   Constitution: Negative.   HENT: Negative.    Eyes: Negative.    Cardiovascular: Negative for chest pain, claudication, cyanosis, dyspnea on exertion, irregular heartbeat, leg swelling, near-syncope, orthopnea, palpitations, paroxysmal nocturnal dyspnea and syncope.   Respiratory: Negative.    Endocrine: Negative.    Hematologic/Lymphatic: Negative.    Skin: Negative.    Gastrointestinal: Negative for anorexia.   Genitourinary: Negative.    Neurological: Negative.    Psychiatric/Behavioral: Negative.        Objective     Physical Exam:  /88   Pulse 75   Ht 160 cm (62.99\")   Wt 95.3 kg (210 lb)   SpO2 99%   BMI 37.21 kg/m²   Physical Exam   Constitutional: She appears well-developed.   HENT:   Head: Normocephalic.   Neck: Normal carotid pulses and no JVD present. No tracheal tenderness present. Carotid bruit is not present. No tracheal deviation and no edema present.   Cardiovascular: Regular rhythm, normal heart sounds and normal pulses.    Pulmonary/Chest: Effort normal. No stridor.   Abdominal: Soft.   Neurological: She is alert. She has normal strength. No cranial nerve deficit or sensory deficit.   Skin: Skin is warm.   Psychiatric: She has a normal mood and affect. Her speech is normal and behavior is normal. "       Results Review:       ECG 12 Lead  Date/Time: 10/29/2018 11:29 AM  Performed by: RUIZ KING  Authorized by: RUIZ KING   Comparison: compared with previous ECG from 1/3/2018  Similar to previous ECG  Rhythm: sinus rhythm  Rate: normal  Conduction: conduction normal  ST Segments: ST segments normal  T Waves: T waves normal  QRS axis: normal  Other: no other findings  Clinical impression: normal ECG            Assessment/Plan   Patient Active Problem List   Diagnosis   • Class 2 obesity in adult   • Vitamin D deficiency   • Essential hypertension   • Status post laparoscopic sleeve gastrectomy   • Gastroesophageal reflux disease without esophagitis   • Diastolic dysfunction       No palpitations. No significant pedal edema. Compliant with medications and diet. Latest labs and medications reviewed.  Treadmill stress test normal  LV diastolic dysfunction on echo        Plan        Increase Lisinopril from 10 mg BID to 20 mg BID  Monitor BMP    Requested Prescriptions     Signed Prescriptions Disp Refills   • lisinopril (PRINIVIL,ZESTRIL) 20 MG tablet 180 tablet 3     Sig: Take 1 tablet by mouth 2 (Two) Times a Day.      Went over mechanisms and treatment of diastolic dysfunction and showed the patient anatomical chart of the heart to assist in understanding better     Recommend evaluation for obstructive sleep apnea given snoring and daytime somnolence and fatigue by primary provider    xxxxxxxxxxxxxxxxxxxxxxxxxxxxxxxxxxxxxxxxxxxxxxxxxxxxxxxxxxxxxxxxxxxxxxxxxxxxxxxxxxxxxxxxxxxxxxxxxxxxxxxxxxxxxxxxxxxxxxxxxxxxxxxxxxxxxxxxxxxxxxxxxxxxxxxxxxxxxxxxxxxxxxxxxxxxxxxxxxxxxxxxxxxxxxxxxxxxxxxxxxxxxxxxxxxxxxxxxxxxxxxxxxxxxxxxxxxxxxxxxxxxxxxx  Health maintenance    Low salt/ HTN/ Heart healthy carbohydrate restricted cardiac diet as applicable to this patient's current diagnoses.   This handout has relevant information regarding shopping for food, preparing meals, what to eat at restaurants, tracking of food intake,  "information regarding sodium intake and salt content, how to read food labels, knowing what to eat, tips reagarding physical activity, calorie count and calorie expenditure. What foods to avoid. Information regarding alcoholic drinks along with \"good\" and \"bad\" fats.  Reiterated prior recommendations     The patient is advised to reduce or avoid caffeine or other cardiac stimulants.     The patient was advised that NSAID-type medications have three  very important potential side effects: cardiovascular complications, gastrointestinal irritation including hemorrhage and renal injuries.  Do not use anti-inflammatories such as Motrin/ibuprofen, Alleve/naprosyn, Mobic and like medications Use Tylenol instead.The patient expresses understanding of these issues and questions were answered.   Monitor for any signs of bleeding including red or dark stools. Fall precautions.       Monitor for any signs of bleeding including red or dark stools. Fall precautions.   Patient is asked to monitor BP at home or work, several times per month and return with written values at next office visit.     Advised staying uptodate with immunizations per established standard guidelines.      Offered to give patient  a copy of my notes and relevant tests/ prior ECG etc for the patient to review and follow specific advise and relevant findings if any, prognosis, along with my current and future plans.      Questions were encouraged, asked and answered to the patient's  understanding and satisfaction. Questions if any regarding current medications and side effects, need for refills and importance of compliance to medications stressed.    Reviewed available prior notes, consults, prior visits, laboratory findings, radiology and cardiology relevant reports. Updated chart as applicable. I have reviewed the patient's medical history in detail and updated the computerized patient record as relevant.      Updated patient regarding any new or relevant " abnormalities on review of records or any new findings on physical exam. Mentioned to patient about purpose of visit and desirable health short and long term goals and objectives.        xxxxxxxxxxxxxxxxxxxxxxxxxxxxxxxxxxxxxxxxxxxxxxxxxxxxxxxxxxxxxxxxxxxxxxxxxxxxxxxxxxxxxxxxxxxxxxxxxxxxxxxxxxxxxxxxxxxxxxxxxxxxxxxxxxxxxxxxxxxxxxxxxxxxxxxxxxxxxxxxxxxxxxxxxxxxxxxxxxxxxxxxxxxxxxxxxxxxxxxxxxxxxxxxxxxxxxxxxxxxxxxxxxxxxxxxxxxxxxxxxxxxxxxx    Return in about 3 months (around 1/29/2019).

## 2018-10-31 LAB
COPPER SERPL-MCNC: 109 UG/DL (ref 72–166)
ZINC SERPL-MCNC: 77 UG/DL (ref 56–134)

## 2018-11-01 LAB — VIT B1 BLD-SCNC: 161.2 NMOL/L (ref 66.5–200)

## 2018-11-02 LAB
A-TOCOPHEROL VIT E SERPL-MCNC: 15.7 MG/L (ref 7–25.1)
GAMMA TOCOPHEROL SERPL-MCNC: 1.6 MG/L (ref 0.5–5.5)
VIT A SERPL-MCNC: 44.2 UG/DL (ref 33.1–100)

## 2018-11-05 LAB — PHYTONADIONE SERPL-MCNC: 0.17 NG/ML (ref 0.13–1.88)

## 2019-03-08 RX ORDER — ERGOCALCIFEROL 1.25 MG/1
CAPSULE ORAL
Qty: 4 CAPSULE | Refills: 2 | OUTPATIENT
Start: 2019-03-08

## 2019-03-21 RX ORDER — ERGOCALCIFEROL 1.25 MG/1
CAPSULE ORAL
Qty: 4 CAPSULE | Refills: 2 | OUTPATIENT
Start: 2019-03-21

## 2019-09-13 NOTE — TELEPHONE ENCOUNTER
Called patient to remind her to her labs drawn. She said she did at her PCP office on Monday and they're going to fax us the results.  
unknown

## 2019-10-17 ENCOUNTER — TELEPHONE (OUTPATIENT)
Dept: BARIATRICS/WEIGHT MGMT | Facility: CLINIC | Age: 49
End: 2019-10-17

## 2019-10-31 ENCOUNTER — OFFICE VISIT (OUTPATIENT)
Dept: BARIATRICS/WEIGHT MGMT | Facility: CLINIC | Age: 49
End: 2019-10-31

## 2019-10-31 VITALS
BODY MASS INDEX: 41.82 KG/M2 | OXYGEN SATURATION: 98 % | HEART RATE: 66 BPM | DIASTOLIC BLOOD PRESSURE: 99 MMHG | TEMPERATURE: 98 F | HEIGHT: 63 IN | WEIGHT: 236 LBS | SYSTOLIC BLOOD PRESSURE: 159 MMHG

## 2019-10-31 DIAGNOSIS — K21.9 GASTROESOPHAGEAL REFLUX DISEASE WITHOUT ESOPHAGITIS: ICD-10-CM

## 2019-10-31 DIAGNOSIS — E66.01 CLASS 3 SEVERE OBESITY DUE TO EXCESS CALORIES WITH SERIOUS COMORBIDITY AND BODY MASS INDEX (BMI) OF 40.0 TO 44.9 IN ADULT (HCC): Primary | ICD-10-CM

## 2019-10-31 DIAGNOSIS — I10 ESSENTIAL HYPERTENSION: ICD-10-CM

## 2019-10-31 DIAGNOSIS — Z98.84 STATUS POST LAPAROSCOPIC SLEEVE GASTRECTOMY: ICD-10-CM

## 2019-10-31 PROCEDURE — 99214 OFFICE O/P EST MOD 30 MIN: CPT | Performed by: NURSE PRACTITIONER

## 2019-10-31 RX ORDER — NEBIVOLOL 10 MG/1
10 TABLET ORAL DAILY
COMMUNITY

## 2019-10-31 RX ORDER — FUROSEMIDE 20 MG/1
20 TABLET ORAL 2 TIMES DAILY
COMMUNITY

## 2019-10-31 NOTE — PROGRESS NOTES
"   Patient Care Team:  Ludwig Hayes MD as PCP - General  Ludwig Hayes MD as PCP - Family Medicine  Farnaz Pacheco APRN as Referring Physician (Family Medicine)  Ho Yoder MD as Cardiologist (Cardiology)    Reason for Visit:  S/P Sleeve Gastrectomy    Subjective      Alona Owens is post sleeve gastrectomy bariatric surgery. This is a 2 year 4 month follow-up. Patient is able to get 84 ounces of water in per day. Patient is eating 5 times/off and on all day, approximately 1.5 cups in volume, which she states she measures. Patient is able to get 80 grams of protein in per day. Patient is taking a multiple vitamin, calcium with vitamin D supplement, and vitamin B12 supplement as advised. Patient is exercising by walking 20-30 minutes once a week. Patient does not have any complaints of reflux, dysphagia, N/V, or abdominal pain. She has gained 25 lbs since her last annual follow up appointment.    Vitals:    10/31/19 0851   BP: 159/99   BP Location: Left arm   Patient Position: Sitting   Cuff Size: Adult   Pulse: 66   Temp: 98 °F (36.7 °C)   SpO2: 98%   Weight: 107 kg (236 lb)   Height: 160 cm (63\")         Review of Systems  Negative except the below listed  General ROS: positive for  - sleep disturbance and weight gain  Cardiovascular ROS: positive for - edema  Gastrointestinal ROS: positive for - heartburn    History  Past Medical History:   Diagnosis Date   • Fatty liver    • Granuloma of skin determined by biopsy     Pyrogenic on left arch removed by Dr Freddy Mao on    • Heartburn     mild-she uses PPI's as needed     • Hyperlipidemia    • Hypertension    • Migraines     Occasionally    • Obesity    • PONV (postoperative nausea and vomiting)      Past Surgical History:   Procedure Laterality Date   • ABDOMINOPLASTY     • BREAST IMPLANT SURGERY     •  SECTION     • CHOLECYSTECTOMY     • ENDOMETRIAL ABLATION     • GASTRIC SLEEVE LAPAROSCOPIC N/A 2017    Procedure: " GASTRIC SLEEVE LAPAROSCOPIC, HIATAL HERNIA REPAIR;  Surgeon: Andrews Sanchez MD;  Location: [facility];  Service:    • UPPER GASTROINTESTINAL ENDOSCOPY  07/12/2016    Dr Castaneda at [facility]      Family History   Problem Relation Age of Onset   • Obesity Mother    • Hypertension Mother    • No Known Problems Father      Social History     Tobacco Use   • Smoking status: Never Smoker   • Smokeless tobacco: Never Used   Substance Use Topics   • Alcohol use: No     Alcohol/week: 0.6 oz     Types: 1 Glasses of wine per week   • Drug use: No       (Not in a hospital admission)  Allergies:  Patient has no known allergies.      Current Outpatient Medications:   •  ALPRAZolam (XANAX) 0.25 MG tablet, Take 0.25 mg by mouth Daily., Disp: , Rfl:   •  BIOTIN PO, Take  by mouth., Disp: , Rfl:   •  Calcium-Phosphorus-Vitamin D (CITRACAL +D3 PO), Take  by mouth., Disp: , Rfl:   •  ergocalciferol (ERGOCALCIFEROL) 30758 units capsule, Take 1 capsule by mouth 1 (One) Time Per Week., Disp: 4 capsule, Rfl: 3  •  escitalopram (LEXAPRO) 5 MG tablet, Take 5 mg by mouth Daily., Disp: , Rfl:   •  lisinopril (PRINIVIL,ZESTRIL) 20 MG tablet, Take 1 tablet by mouth 2 (Two) Times a Day., Disp: 180 tablet, Rfl: 3  •  Multiple Vitamins-Minerals (CENTRUM ADULTS PO), Take  by mouth., Disp: , Rfl:   •  omeprazole (PriLOSEC) 20 MG capsule, Take 20 mg by mouth daily., Disp: , Rfl:   •  vitamin B-12 (CYANOCOBALAMIN) 500 MCG tablet, Take  by mouth 1 (One) Time Per Week., Disp: , Rfl:     Objective     Vital Signs  Temp:  [98 °F (36.7 °C)] 98 °F (36.7 °C)  Heart Rate:  [66] 66  BP: (159)/(99) 159/99  Body mass index is 41.81 kg/m².      10/31/19  0851   Weight: 107 kg (236 lb)       Physical Exam:  HEENT: extra ocular movement intact  Respiratory: appears well, vitals normal, no respiratory distress, acyanotic, normal RR, chest clear, no wheezing, crepitations, rhonchi, normal symmetric air entry  Cardiovascular: Regular rate and rhythm,  S1, S2 normal, no murmur, click, rub or gallop. Bilateral lower extremities with 2+ non-pitting edema.  GI: Soft, non-tender, normal bowel sounds; no bruits, organomegaly or masses.  Abnormal shape: obese  Musculoskeletal: inspection - no abnormality, range of motion normal  Neurologic: alert, oriented, normal speech, no focal findings or movement disorder noted       Results Review:   None        Assessment/Plan   Encounter Diagnoses   Name Primary?   • Class 3 severe obesity due to excess calories with serious comorbidity and body mass index (BMI) of 40.0 to 44.9 in adult (CMS/[facility]) Yes   • Essential hypertension    • Gastroesophageal reflux disease without esophagitis    • Status post laparoscopic sleeve gastrectomy          1. Alona Owens and I discussed the importance of changing behavior for consistent and successful weight loss long term.  We first reviewed again the definition of a meal which is defined as portion sizes less than a half a cup and those portions incorporating a protein.  Specifically the protein should fill half of that volume.  I also explained that she should attempt to consume 4-5 meals as defined above daily and to avoid snacking or grazing.  She should also continue to be mindful of adequate hydration by consuming at least 64 oz of water daily, without eating and drinking simultaneously, and continue to incorporate a regular exercise regimen. I discussed the importance of taking her multivitamins as directed. She was given an annual education handout. Her PCP monitors her yearly labs.    Goals: measure every meal as defined above, start with decreasing to 1 cup then go to 1/2 cup as recommended, do not drink and eat together, and return to following s/p sleeve gastrectomy recommendations. Patient was encouraged to call with questions, concerns, or obstacles as they may arise prior to next appointment. Support group attendance encouraged.    2. Current comorbid conditions of  hypertension and GERD associated with her morbid obesity are reported to be stable on her current treatment regimen and medications. We anticipate the comorbid conditions to improve as we address her morbid obesity.     She is to return to our office 2 months or as needed.    DELON Green    10/31/19  8:56 AM  Patient Care Team:  Ludwig Hayes MD as PCP - General  Ludwig Hayes MD as PCP - Family Medicine  Farnaz Pacheco APRN as Referring Physician (Family Medicine)  Ho oYder MD as Cardiologist (Cardiology)

## 2019-11-01 ENCOUNTER — LAB (OUTPATIENT)
Dept: LAB | Facility: HOSPITAL | Age: 49
End: 2019-11-01

## 2019-11-01 ENCOUNTER — OFFICE VISIT (OUTPATIENT)
Dept: CARDIOLOGY | Facility: CLINIC | Age: 49
End: 2019-11-01

## 2019-11-01 VITALS
OXYGEN SATURATION: 98 % | BODY MASS INDEX: 41.18 KG/M2 | SYSTOLIC BLOOD PRESSURE: 140 MMHG | HEIGHT: 63 IN | WEIGHT: 232.4 LBS | HEART RATE: 68 BPM | DIASTOLIC BLOOD PRESSURE: 102 MMHG

## 2019-11-01 DIAGNOSIS — R60.0 PEDAL EDEMA: ICD-10-CM

## 2019-11-01 DIAGNOSIS — E66.09 CLASS 2 OBESITY DUE TO EXCESS CALORIES WITHOUT SERIOUS COMORBIDITY WITH BODY MASS INDEX (BMI) OF 37.0 TO 37.9 IN ADULT: ICD-10-CM

## 2019-11-01 DIAGNOSIS — I10 ESSENTIAL HYPERTENSION: ICD-10-CM

## 2019-11-01 DIAGNOSIS — Z98.84 STATUS POST LAPAROSCOPIC SLEEVE GASTRECTOMY: ICD-10-CM

## 2019-11-01 DIAGNOSIS — E55.9 VITAMIN D DEFICIENCY: Primary | ICD-10-CM

## 2019-11-01 DIAGNOSIS — I51.89 DIASTOLIC DYSFUNCTION: ICD-10-CM

## 2019-11-01 DIAGNOSIS — K21.9 GASTROESOPHAGEAL REFLUX DISEASE WITHOUT ESOPHAGITIS: ICD-10-CM

## 2019-11-01 LAB
BACTERIA UR QL AUTO: ABNORMAL /HPF
BILIRUB UR QL STRIP: NEGATIVE
CLARITY UR: CLEAR
COLOR UR: YELLOW
GLUCOSE UR STRIP-MCNC: NEGATIVE MG/DL
HGB UR QL STRIP.AUTO: NEGATIVE
HYALINE CASTS UR QL AUTO: ABNORMAL /LPF
KETONES UR QL STRIP: NEGATIVE
LEUKOCYTE ESTERASE UR QL STRIP.AUTO: NEGATIVE
NITRITE UR QL STRIP: NEGATIVE
NT-PROBNP SERPL-MCNC: 143.7 PG/ML (ref 5–450)
PH UR STRIP.AUTO: 7 [PH] (ref 5–8)
PROT UR QL STRIP: ABNORMAL
RBC # UR: ABNORMAL /HPF
REF LAB TEST METHOD: ABNORMAL
SP GR UR STRIP: 1.02 (ref 1–1.03)
SQUAMOUS #/AREA URNS HPF: ABNORMAL /HPF
UROBILINOGEN UR QL STRIP: ABNORMAL
WBC UR QL AUTO: ABNORMAL /HPF

## 2019-11-01 PROCEDURE — 36415 COLL VENOUS BLD VENIPUNCTURE: CPT

## 2019-11-01 PROCEDURE — 83880 ASSAY OF NATRIURETIC PEPTIDE: CPT | Performed by: INTERNAL MEDICINE

## 2019-11-01 PROCEDURE — 93000 ELECTROCARDIOGRAM COMPLETE: CPT | Performed by: INTERNAL MEDICINE

## 2019-11-01 PROCEDURE — 99214 OFFICE O/P EST MOD 30 MIN: CPT | Performed by: INTERNAL MEDICINE

## 2019-11-01 PROCEDURE — 81001 URINALYSIS AUTO W/SCOPE: CPT | Performed by: INTERNAL MEDICINE

## 2019-11-01 RX ORDER — CLONIDINE 0.1 MG/24H
1 PATCH, EXTENDED RELEASE TRANSDERMAL WEEKLY
Qty: 12 PATCH | Refills: 3 | Status: SHIPPED | OUTPATIENT
Start: 2019-11-01 | End: 2019-12-13

## 2019-11-01 RX ORDER — CLONIDINE HYDROCHLORIDE 0.1 MG/1
0.1 TABLET ORAL 2 TIMES DAILY PRN
Status: DISCONTINUED | OUTPATIENT
Start: 2019-11-01 | End: 2020-10-20

## 2019-11-01 NOTE — PROGRESS NOTES
Alona Owens  [id number]  1970  49 y.o.  female    Referring Provider: Ludwig Hayes MD    Reason for Follow-up Visit: Essential Hypertension  S/P gastric bypass  Has gained weight back     Subjective    Overall feeling well   No chest pain or shortness of breath   No palpitations    No significant pedal edema  Compliant with medications and dietary advice  Good effort tolerance    No presyncope or syncope  Compliant with medications    Recently had fluid retention from Norvasc and was started on Lasix PRN      BP elevated at home       History of present illness:  Alona Owens is a 49 y.o. yo female with history of Essential Hypertension    who presents today for   Chief Complaint   Patient presents with   • Atrial Fibrillation     1 year follow up    • Edema     Patient stated in her hands and feet    • Hypertension     patient stated yearterday in her left arm it was 175/117 & right arms was 155/99    .    History  Past Medical History:   Diagnosis Date   • Fatty liver    • Granuloma of skin determined by biopsy     Pyrogenic on left arch removed by Dr Freddy Mao on    • Heartburn     mild-she uses PPI's as needed     • Hyperlipidemia    • Hypertension    • Migraines     Occasionally    • Obesity    • PONV (postoperative nausea and vomiting)    ,   Past Surgical History:   Procedure Laterality Date   • ABDOMINOPLASTY     • BREAST IMPLANT SURGERY     •  SECTION     • CHOLECYSTECTOMY     • ENDOMETRIAL ABLATION     • GASTRIC SLEEVE LAPAROSCOPIC N/A 2017    Procedure: GASTRIC SLEEVE LAPAROSCOPIC, HIATAL HERNIA REPAIR;  Surgeon: Andrews Sanchez MD;  Location: [facility];  Service:    • UPPER GASTROINTESTINAL ENDOSCOPY  2016    Dr Castaneda at [facility]    ,   Family History   Problem Relation Age of Onset   • Obesity Mother    • Hypertension Mother    • No Known Problems Father    ,   Social History     Tobacco Use   • Smoking status: Never Smoker   •  Smokeless tobacco: Never Used   Substance Use Topics   • Alcohol use: No     Alcohol/week: 0.6 oz     Types: 1 Glasses of wine per week   • Drug use: No   ,     Medications  Current Outpatient Medications   Medication Sig Dispense Refill   • ALPRAZolam (XANAX) 0.25 MG tablet Take 0.25 mg by mouth Daily.     • BIOTIN PO Take  by mouth.     • Calcium-Phosphorus-Vitamin D (CITRACAL +D3 PO) Take  by mouth.     • ergocalciferol (ERGOCALCIFEROL) 97296 units capsule Take 1 capsule by mouth 1 (One) Time Per Week. 4 capsule 3   • escitalopram (LEXAPRO) 5 MG tablet Take 5 mg by mouth Daily.     • furosemide (LASIX) 20 MG tablet Take 20 mg by mouth 2 (Two) Times a Day.     • lisinopril (PRINIVIL,ZESTRIL) 20 MG tablet Take 1 tablet by mouth 2 (Two) Times a Day. 180 tablet 3   • Multiple Vitamins-Minerals (CENTRUM ADULTS PO) Take  by mouth.     • nebivolol (BYSTOLIC) 10 MG tablet Take 10 mg by mouth Daily.     • omeprazole (PriLOSEC) 20 MG capsule Take 20 mg by mouth daily.     • vitamin B-12 (CYANOCOBALAMIN) 500 MCG tablet Take  by mouth 1 (One) Time Per Week.     • cloNIDine (CATAPRES-TTS) 0.1 MG/24HR patch Place 1 patch on the skin as directed by provider 1 (One) Time Per Week. 12 patch 3     Current Facility-Administered Medications   Medication Dose Route Frequency Provider Last Rate Last Dose   • cloNIDine (CATAPRES) tablet 0.1 mg  0.1 mg Oral BID Ho Bell MD           Allergies:  Patient has no known allergies.    Review of Systems  Review of Systems   Constitution: Positive for weakness and malaise/fatigue.   HENT: Negative.    Eyes: Negative.    Cardiovascular: Positive for leg swelling. Negative for chest pain, claudication, cyanosis, dyspnea on exertion, irregular heartbeat, near-syncope, orthopnea, palpitations, paroxysmal nocturnal dyspnea and syncope.   Respiratory: Negative.    Endocrine: Negative.    Hematologic/Lymphatic: Negative.    Skin: Negative.    Gastrointestinal: Negative for anorexia.  "  Genitourinary: Negative.    Psychiatric/Behavioral: Negative.        Objective     Physical Exam:  BP (!) 140/102   Pulse 68   Ht 160 cm (63\")   Wt 105 kg (232 lb 6.4 oz)   SpO2 98%   BMI 41.17 kg/m²   Physical Exam   Constitutional: She appears well-developed.   HENT:   Head: Normocephalic.   Neck: Normal carotid pulses and no JVD present. No tracheal tenderness present. Carotid bruit is not present. No tracheal deviation and no edema present.   Cardiovascular: Regular rhythm, normal heart sounds and normal pulses.   Pulmonary/Chest: Effort normal. No stridor.   Abdominal: Soft. She exhibits no distension. There is no hepatosplenomegaly. There is no tenderness.   Neurological: She is alert. She has normal strength. No cranial nerve deficit or sensory deficit.   Skin: Skin is warm.   Psychiatric: She has a normal mood and affect. Her speech is normal and behavior is normal.       Results Review:      Results for orders placed during the hospital encounter of 03/10/17   Adult Transthoracic Echo Complete    Narrative · Left ventricular function is normal. Estimated EF = 60%.  · Left ventricular diastolic function is normal.  · Estimated right ventricular systolic pressure from tricuspid   regurgitation is normal (<35 mmHg).              ECG 12 Lead  Date/Time: 11/1/2019 9:52 AM  Performed by: Ho Yoder MD  Authorized by: Ho Yoder MD   Comparison: compared with previous ECG from 10/29/2018  Similar to previous ECG  Rhythm: sinus bradycardia  Rate: bradycardic  Conduction: conduction normal  ST Segments: ST segments normal  QRS axis: right  Other findings: non-specific ST-T wave changes    Clinical impression: abnormal EKG            Assessment/Plan   Patient Active Problem List   Diagnosis   • Class 2 obesity in adult   • Vitamin D deficiency   • Essential hypertension   • Status post laparoscopic sleeve gastrectomy   • Gastroesophageal reflux disease without esophagitis   • Diastolic dysfunction "           Plan    Recommend evaluation for obstructive sleep apnea given snoring and daytime somnolence and fatigue by primary provider  In addition has body habitus including oropharyngeal crowding increasing the likelihood of obstructive sleep apnea        Requested Prescriptions     Signed Prescriptions Disp Refills   • cloNIDine (CATAPRES-TTS) 0.1 MG/24HR patch 12 patch 3     Sig: Place 1 patch on the skin as directed by provider 1 (One) Time Per Week.      Check BP and heart rates twice daily at least 3x / week at home and bring a recording for me to review next visit  If BP >140/90 or < 100/60 persistently over 3 reading 30 mins apart call sooner     Orders Placed This Encounter   Procedures   • BNP     Standing Status:   Future     Standing Expiration Date:   11/1/2020   • ECG 12 Lead     This order was created via procedure documentation   • Urinalysis With Microscopic - Urine, Clean Catch     Standing Status:   Future     Standing Expiration Date:   11/1/2020        ____________________________________________________________________________________________________________________________________________  Health maintenance and recommendations      Low salt/ HTN/ Heart healthy carbohydrate restricted cardiac diet   The patient is advised to reduce or avoid caffeine or other cardiac stimulants.     The patient was advised to avoid long term NSAIDS , use Tylenol PRN instead  Avoid cardiac stimulants including common drugs like Pseudoephedrine or excessive amounts of caffeine    Monitor for any signs of bleeding including red or dark stools. Fall precautions.        Advised staying uptodate with immunizations per established standard guidelines.      Offered to give patient  a copy      Questions were encouraged, asked and answered to the patient's  understanding and satisfaction. Questions if any regarding current medications and side effects, need for refills and importance of compliance to medications  stressed.    Reviewed available prior notes, consults, prior visits, laboratory findings, radiology and cardiology relevant reports. Updated chart as applicable. I have reviewed the patient's medical history in detail and updated the computerized patient record as relevant.      Updated patient regarding any new or relevant abnormalities on review of records or any new findings on physical exam. Mentioned to patient about purpose of visit and desirable health short and long term goals and objectives.    Primary to monitor CBC CMP Lipid panel and TSH as applicable    ___________________________________________________________________________________________________________________________________________                Return in about 6 weeks (around 12/13/2019).

## 2019-11-05 ENCOUNTER — TELEPHONE (OUTPATIENT)
Dept: CARDIOLOGY | Facility: CLINIC | Age: 49
End: 2019-11-05

## 2019-12-13 ENCOUNTER — OFFICE VISIT (OUTPATIENT)
Dept: CARDIOLOGY | Facility: CLINIC | Age: 49
End: 2019-12-13

## 2019-12-13 VITALS
OXYGEN SATURATION: 98 % | SYSTOLIC BLOOD PRESSURE: 140 MMHG | BODY MASS INDEX: 41.64 KG/M2 | HEART RATE: 78 BPM | HEIGHT: 63 IN | DIASTOLIC BLOOD PRESSURE: 85 MMHG | WEIGHT: 235 LBS

## 2019-12-13 DIAGNOSIS — Z98.84 STATUS POST LAPAROSCOPIC SLEEVE GASTRECTOMY: Primary | ICD-10-CM

## 2019-12-13 DIAGNOSIS — E66.09 CLASS 2 OBESITY DUE TO EXCESS CALORIES WITHOUT SERIOUS COMORBIDITY WITH BODY MASS INDEX (BMI) OF 37.0 TO 37.9 IN ADULT: ICD-10-CM

## 2019-12-13 DIAGNOSIS — I10 ESSENTIAL HYPERTENSION: ICD-10-CM

## 2019-12-13 DIAGNOSIS — K21.9 GASTROESOPHAGEAL REFLUX DISEASE WITHOUT ESOPHAGITIS: ICD-10-CM

## 2019-12-13 DIAGNOSIS — I51.89 DIASTOLIC DYSFUNCTION: ICD-10-CM

## 2019-12-13 PROCEDURE — 99214 OFFICE O/P EST MOD 30 MIN: CPT | Performed by: INTERNAL MEDICINE

## 2019-12-13 RX ORDER — CLONIDINE 0.2 MG/24H
1 PATCH, EXTENDED RELEASE TRANSDERMAL WEEKLY
Qty: 12 PATCH | Refills: 3 | Status: SHIPPED | OUTPATIENT
Start: 2019-12-13 | End: 2020-06-15

## 2019-12-13 NOTE — PROGRESS NOTES
Alona Owens  [id number]  1970  49 y.o.  female    Referring Provider: Ludwig Hayes MD    Reason for Follow-up Visit: Here for routine follow up   Essential Hypertension  S/P gastric bypass  Has gained weight back   Reviewed echo images from [place] and shows borderline LVH and diastolic dysfunction     Subjective    BP better some but still elevated all day   No chest pain or shortness of breath   No palpitations    No significant pedal edema  Compliant with medications and dietary advice  Good effort tolerance    No presyncope or syncope  Compliant with medications    Prior to last had fluid retention from Norvasc and was started on Lasix PRN    BP elevated at home   Easy fatiguability     History of present illness:  Alona Owens is a 49 y.o. yo female with history of Essential Hypertension    who presents today for   Chief Complaint   Patient presents with   • Hypertension     6 week follow up    • Edema   .    History  Past Medical History:   Diagnosis Date   • Fatty liver    • Granuloma of skin determined by biopsy     Pyrogenic on left arch removed by Dr Freddy Mao on    • Heartburn     mild-she uses PPI's as needed     • Hyperlipidemia    • Hypertension    • Migraines     Occasionally    • Obesity    • PONV (postoperative nausea and vomiting)    ,   Past Surgical History:   Procedure Laterality Date   • ABDOMINOPLASTY     • BREAST IMPLANT SURGERY     •  SECTION     • CHOLECYSTECTOMY     • ENDOMETRIAL ABLATION     • GASTRIC SLEEVE LAPAROSCOPIC N/A 2017    Procedure: GASTRIC SLEEVE LAPAROSCOPIC, HIATAL HERNIA REPAIR;  Surgeon: Andrews Sanchez MD;  Location: [facility];  Service:    • UPPER GASTROINTESTINAL ENDOSCOPY  2016    Dr Castaneda at [facility]    ,   Family History   Problem Relation Age of Onset   • Obesity Mother    • Hypertension Mother    • No Known Problems Father    ,   Social History     Tobacco Use   • Smoking status: Never  Smoker   • Smokeless tobacco: Never Used   Substance Use Topics   • Alcohol use: No     Alcohol/week: 1.0 standard drinks     Types: 1 Glasses of wine per week   • Drug use: No   ,     Medications  Current Outpatient Medications   Medication Sig Dispense Refill   • ALPRAZolam (XANAX) 0.25 MG tablet Take 0.25 mg by mouth Daily.     • BIOTIN PO Take  by mouth.     • Calcium-Phosphorus-Vitamin D (CITRACAL +D3 PO) Take  by mouth.     • ergocalciferol (ERGOCALCIFEROL) 01315 units capsule Take 1 capsule by mouth 1 (One) Time Per Week. 4 capsule 3   • escitalopram (LEXAPRO) 5 MG tablet Take 5 mg by mouth Daily.     • furosemide (LASIX) 20 MG tablet Take 20 mg by mouth 2 (Two) Times a Day.     • lisinopril (PRINIVIL,ZESTRIL) 20 MG tablet Take 1 tablet by mouth 2 (Two) Times a Day. 180 tablet 3   • Multiple Vitamins-Minerals (CENTRUM ADULTS PO) Take  by mouth.     • nebivolol (BYSTOLIC) 10 MG tablet Take 10 mg by mouth Daily.     • omeprazole (PriLOSEC) 20 MG capsule Take 20 mg by mouth daily.     • vitamin B-12 (CYANOCOBALAMIN) 500 MCG tablet Take  by mouth 1 (One) Time Per Week.     • cloNIDine (CATAPRES-TTS) 0.2 MG/24HR patch Place 1 patch on the skin as directed by provider 1 (One) Time Per Week. 12 patch 3     Current Facility-Administered Medications   Medication Dose Route Frequency Provider Last Rate Last Dose   • cloNIDine (CATAPRES) tablet 0.1 mg  0.1 mg Oral BID PRHo Puga MD           Allergies:  Patient has no known allergies.    Review of Systems  Review of Systems   Constitution: Positive for malaise/fatigue.   HENT: Negative.    Eyes: Negative.    Cardiovascular: Positive for leg swelling. Negative for chest pain, claudication, cyanosis, dyspnea on exertion, irregular heartbeat, near-syncope, orthopnea, palpitations, paroxysmal nocturnal dyspnea and syncope.   Respiratory: Negative.    Endocrine: Negative.    Hematologic/Lymphatic: Negative.    Skin: Negative.    Gastrointestinal: Negative for  "anorexia.   Genitourinary: Negative.    Neurological: Positive for weakness.   Psychiatric/Behavioral: Negative.        Objective     Physical Exam:  /85 (BP Location: Right arm, Patient Position: Sitting, Cuff Size: Adult)   Pulse 78   Ht 160 cm (63\")   Wt 107 kg (235 lb)   SpO2 98%   BMI 41.63 kg/m²   Physical Exam   Constitutional: She appears well-developed.   HENT:   Head: Normocephalic.   Neck: Normal carotid pulses and no JVD present. No tracheal tenderness present. Carotid bruit is not present. No tracheal deviation and no edema present.   Cardiovascular: Regular rhythm, normal heart sounds and normal pulses.   Pulmonary/Chest: Effort normal. No stridor.   Abdominal: Soft. She exhibits no distension. There is no hepatosplenomegaly. There is no tenderness.   Neurological: She is alert. She has normal strength. No cranial nerve deficit or sensory deficit.   Skin: Skin is warm.   Psychiatric: She has a normal mood and affect. Her speech is normal and behavior is normal.       Results Review:      Results for orders placed during the hospital encounter of 03/10/17   Adult Transthoracic Echo Complete    Narrative · Left ventricular function is normal. Estimated EF = 60%.  · Left ventricular diastolic function is normal.  · Estimated right ventricular systolic pressure from tricuspid   regurgitation is normal (<35 mmHg).            Procedures    Assessment/Plan   Patient Active Problem List   Diagnosis   • Class 2 obesity in adult   • Vitamin D deficiency   • Essential hypertension   • Status post laparoscopic sleeve gastrectomy   • Gastroesophageal reflux disease without esophagitis   • Diastolic dysfunction           Plan      Due for sleep study in January 2020 [facility]     Recommend evaluation for obstructive sleep apnea given snoring and daytime somnolence and fatigue by primary provider  In addition has body habitus including oropharyngeal crowding increasing the likelihood of obstructive sleep apnea  "        Check BP and heart rates twice daily at least 3x / week at home and bring a recording for me to review next visit  If BP >140/90 or < 100/60 persistently over 3 reading 30 mins apart call sooner     Requested Prescriptions     Signed Prescriptions Disp Refills   • cloNIDine (CATAPRES-TTS) 0.2 MG/24HR patch 12 patch 3     Sig: Place 1 patch on the skin as directed by provider 1 (One) Time Per Week.           Orders Placed This Encounter   Procedures   • US renal artery complete     Standing Status:   Future     Standing Expiration Date:   12/13/2020     Order Specific Question:   Reason for Exam:     Answer:   htn   • US renal bilateral     Standing Status:   Future     Standing Expiration Date:   12/13/2020     Order Specific Question:   Reason for Exam:     Answer:   HTN   • Ambulatory Referral to Nephrology     Referral Priority:   Routine     Referral Type:   Consultation     Referral Reason:   Specialty Services Required     Requested Specialty:   Nephrology     Number of Visits Requested:   1        Wants to see Dr Contreras    Increase Clonidine patch    Keep f/u Dr Sanchez         ____________________________________________________________________________________________________________________________________________  Health maintenance and recommendations      Similar recommendations as last visit     Advised staying uptodate with immunizations per established standard guidelines.      Offered to give patient  a copy      Questions were encouraged, asked and answered to the patient's  understanding and satisfaction. Questions if any regarding current medications and side effects, need for refills and importance of compliance to medications stressed.    Reviewed available prior notes, consults, prior visits, laboratory findings, radiology and cardiology relevant reports. Updated chart as applicable. I have reviewed the patient's medical history in detail and updated the computerized patient record as  relevant.      Updated patient regarding any new or relevant abnormalities on review of records or any new findings on physical exam. Mentioned to patient about purpose of visit and desirable health short and long term goals and objectives.    Primary to monitor CBC CMP Lipid panel and TSH as applicable    ___________________________________________________________________________________________________________________________________________            Return in about 6 months (around 6/13/2020).

## 2019-12-20 ENCOUNTER — HOSPITAL ENCOUNTER (OUTPATIENT)
Dept: ULTRASOUND IMAGING | Facility: HOSPITAL | Age: 49
Discharge: HOME | End: 2019-12-20
Admitting: INTERNAL MEDICINE

## 2019-12-20 ENCOUNTER — HOSPITAL ENCOUNTER (OUTPATIENT)
Dept: ULTRASOUND IMAGING | Facility: HOSPITAL | Age: 49
Discharge: HOME | End: 2019-12-20

## 2019-12-20 DIAGNOSIS — I10 ESSENTIAL HYPERTENSION: ICD-10-CM

## 2019-12-20 PROCEDURE — 76775 US EXAM ABDO BACK WALL LIM: CPT

## 2019-12-20 PROCEDURE — 93975 VASCULAR STUDY: CPT

## 2019-12-30 ENCOUNTER — TELEPHONE (OUTPATIENT)
Dept: CARDIOLOGY | Facility: CLINIC | Age: 49
End: 2019-12-30

## 2020-01-28 ENCOUNTER — TELEPHONE (OUTPATIENT)
Dept: CARDIOLOGY | Facility: CLINIC | Age: 50
End: 2020-01-28

## 2020-01-28 NOTE — TELEPHONE ENCOUNTER
Patient called to report the Clonidine patches she was prescribed in December are leaving large red spots on her skin, even after she removes them.  She states the patch itches when she first places it, but is leaving a red almost burn like spot after she removes them.  Can she switch over to oral?

## 2020-01-29 NOTE — TELEPHONE ENCOUNTER
Patient has already been prescribed clonidine 0.1 mg p.o. twice daily as needed  She can take it round-the-clock twice a day  If blood pressures are under good control then let me know and we can prescribe this for 90 days with 3 refills  Okay not to use clonidine patch

## 2020-06-15 ENCOUNTER — OFFICE VISIT (OUTPATIENT)
Dept: CARDIOLOGY | Facility: CLINIC | Age: 50
End: 2020-06-15

## 2020-06-15 VITALS
DIASTOLIC BLOOD PRESSURE: 70 MMHG | BODY MASS INDEX: 41.82 KG/M2 | HEIGHT: 63 IN | SYSTOLIC BLOOD PRESSURE: 117 MMHG | WEIGHT: 236 LBS | HEART RATE: 59 BPM

## 2020-06-15 DIAGNOSIS — Z98.84 STATUS POST LAPAROSCOPIC SLEEVE GASTRECTOMY: ICD-10-CM

## 2020-06-15 DIAGNOSIS — K21.9 GASTROESOPHAGEAL REFLUX DISEASE WITHOUT ESOPHAGITIS: ICD-10-CM

## 2020-06-15 DIAGNOSIS — E66.09 CLASS 2 OBESITY DUE TO EXCESS CALORIES WITHOUT SERIOUS COMORBIDITY WITH BODY MASS INDEX (BMI) OF 37.0 TO 37.9 IN ADULT: ICD-10-CM

## 2020-06-15 DIAGNOSIS — R06.09 DOE (DYSPNEA ON EXERTION): ICD-10-CM

## 2020-06-15 DIAGNOSIS — I51.89 DIASTOLIC DYSFUNCTION: Primary | ICD-10-CM

## 2020-06-15 DIAGNOSIS — I10 ESSENTIAL HYPERTENSION: ICD-10-CM

## 2020-06-15 PROCEDURE — 99214 OFFICE O/P EST MOD 30 MIN: CPT | Performed by: INTERNAL MEDICINE

## 2020-06-15 PROCEDURE — 93000 ELECTROCARDIOGRAM COMPLETE: CPT | Performed by: INTERNAL MEDICINE

## 2020-06-15 RX ORDER — ESCITALOPRAM OXALATE 10 MG/1
10 TABLET ORAL DAILY
COMMUNITY

## 2020-06-15 RX ORDER — FUROSEMIDE 20 MG/1
20 TABLET ORAL 2 TIMES DAILY
COMMUNITY

## 2020-06-15 RX ORDER — SPIRONOLACTONE 50 MG/1
50 TABLET, FILM COATED ORAL DAILY
COMMUNITY
Start: 2020-04-03

## 2020-06-15 RX ORDER — LISINOPRIL 40 MG/1
40 TABLET ORAL 2 TIMES DAILY
COMMUNITY
Start: 2020-05-01

## 2020-06-15 RX ORDER — CLONIDINE HYDROCHLORIDE 0.1 MG/1
TABLET ORAL
COMMUNITY
Start: 2020-05-11 | End: 2020-06-15

## 2020-06-15 NOTE — PROGRESS NOTES
Alona Owens  [id number]  1970  50 y.o.  female    Referring Provider: Ludwig Hayes MD    Reason for Follow-up Visit: Here for routine follow up   Essential Hypertension  S/P gastric bypass  Has gained weight back   Reviewed echo images from [place] and shows borderline LVH and diastolic dysfunction     Subjective        Overall feels well    No new events or complaints since last visit   Saw Dr Cartwright and BP medications were adjusted   BP well controlled at home.     Overall the patient feels no major change from baseline symptoms   Similar symptoms as during last visit     Tolerating current medications well with no untoward side effects   Compliant with prescribed medication regimen. Tries to adhere to cardiac diet.      No chest pain or shortness of breath   No palpitations    No significant pedal edema  Compliant with medications and dietary advice  Good effort tolerance    No presyncope or syncope  Compliant with medications    Prior to last had fluid retention from Norvasc and was started on Lasix PRN    Excellent effort tolerance with no cardiovascular limitations and at the patient's baseline     History of present illness:  Alona Owens is a 50 y.o. yo female with history of Essential Hypertension    who presents today for   Chief Complaint   Patient presents with   • Hypertension     6 MON    .    History  Past Medical History:   Diagnosis Date   • Fatty liver    • Granuloma of skin determined by biopsy     Pyrogenic on left arch removed by Dr Freddy Mao on    • Heartburn     mild-she uses PPI's as needed     • Hyperlipidemia    • Hypertension    • Migraines     Occasionally    • Obesity    • PONV (postoperative nausea and vomiting)    ,   Past Surgical History:   Procedure Laterality Date   • ABDOMINOPLASTY     • BREAST IMPLANT SURGERY     •  SECTION     • CHOLECYSTECTOMY     • ENDOMETRIAL ABLATION     • GASTRIC SLEEVE LAPAROSCOPIC N/A 2017    Procedure:  GASTRIC SLEEVE LAPAROSCOPIC, HIATAL HERNIA REPAIR;  Surgeon: Andrews Sanchez MD;  Location: [facility];  Service:    • UPPER GASTROINTESTINAL ENDOSCOPY  07/12/2016    Dr Castaneda at [facility] [place]    ,   Family History   Problem Relation Age of Onset   • Obesity Mother    • Hypertension Mother    • No Known Problems Father    ,   Social History     Tobacco Use   • Smoking status: Never Smoker   • Smokeless tobacco: Never Used   Substance Use Topics   • Alcohol use: No     Alcohol/week: 1.0 standard drinks     Types: 1 Glasses of wine per week   • Drug use: No   ,     Medications  Current Outpatient Medications   Medication Sig Dispense Refill   • ALPRAZolam (XANAX) 0.25 MG tablet Take 0.25 mg by mouth Daily.     • BIOTIN PO Take  by mouth.     • Calcium-Phosphorus-Vitamin D (CITRACAL +D3 PO) Take  by mouth.     • ergocalciferol (ERGOCALCIFEROL) 34289 units capsule Take 1 capsule by mouth 1 (One) Time Per Week. 4 capsule 3   • escitalopram (LEXAPRO) 10 MG tablet Take 10 mg by mouth Daily.     • furosemide (LASIX) 20 MG tablet Take 20 mg by mouth 2 (Two) Times a Day.     • lisinopril (PRINIVIL,ZESTRIL) 40 MG tablet Take 40 mg by mouth 2 (Two) Times a Day.     • Multiple Vitamins-Minerals (CENTRUM ADULTS PO) Take  by mouth.     • nebivolol (BYSTOLIC) 10 MG tablet Take 10 mg by mouth Daily.     • omeprazole (PriLOSEC) 20 MG capsule Take 20 mg by mouth daily.     • spironolactone (ALDACTONE) 50 MG tablet Take 50 mg by mouth Daily.     • escitalopram (LEXAPRO) 5 MG tablet Take 5 mg by mouth Daily.     • furosemide (LASIX) 20 MG tablet Take 20 mg by mouth 2 (Two) Times a Day.     • vitamin B-12 (CYANOCOBALAMIN) 500 MCG tablet Take  by mouth 1 (One) Time Per Week.       Current Facility-Administered Medications   Medication Dose Route Frequency Provider Last Rate Last Dose   • cloNIDine (CATAPRES) tablet 0.1 mg  0.1 mg Oral BID Ho Bell MD           Allergies:  Patient has no known allergies.    Review  "of Systems  Review of Systems   Constitution: Negative for malaise/fatigue.   HENT: Negative.    Eyes: Negative.    Cardiovascular: Positive for leg swelling. Negative for chest pain, claudication, cyanosis, dyspnea on exertion, irregular heartbeat, near-syncope, orthopnea, palpitations, paroxysmal nocturnal dyspnea and syncope.   Respiratory: Negative.    Endocrine: Negative.    Hematologic/Lymphatic: Negative.    Skin: Negative.    Gastrointestinal: Negative for anorexia.   Genitourinary: Negative.    Neurological: Positive for weakness.   Psychiatric/Behavioral: Negative.        Objective     Physical Exam:  /70   Pulse 59   Ht 160 cm (63\")   Wt 107 kg (236 lb)   BMI 41.81 kg/m²   Physical Exam   Constitutional: She appears well-developed.   HENT:   Head: Normocephalic.   Neck: Normal carotid pulses and no JVD present. No tracheal tenderness present. Carotid bruit is not present. No tracheal deviation and no edema present.   Cardiovascular: Regular rhythm, normal heart sounds and normal pulses.   Pulmonary/Chest: Effort normal. No stridor.   Abdominal: Soft. She exhibits no distension. There is no hepatosplenomegaly. There is no tenderness.     Vascular Status -  Her right foot exhibits abnormal foot edema. Her left foot exhibits abnormal foot edema.  Neurological: She is alert. She has normal strength. No cranial nerve deficit or sensory deficit.   Skin: Skin is warm.   Psychiatric: She has a normal mood and affect. Her speech is normal and behavior is normal.       Results Review:      Results for orders placed during the hospital encounter of 03/10/17   Adult Transthoracic Echo Complete    Narrative · Left ventricular function is normal. Estimated EF = 60%.  · Left ventricular diastolic function is normal.  · Estimated right ventricular systolic pressure from tricuspid   regurgitation is normal (<35 mmHg).              ECG 12 Lead  Date/Time: 6/15/2020 8:22 AM  Performed by: Ho Yoder, " MD  Authorized by: Ho Yoder MD   Comparison: compared with previous ECG from 11/1/2019  Similar to previous ECG  Rhythm: sinus bradycardia  Rate: bradycardic  T inversion: V3, V4, V5 and V6  QRS axis: right  Other findings: low voltage            Assessment/Plan   Patient Active Problem List   Diagnosis   • Class 2 obesity in adult   • Vitamin D deficiency   • Essential hypertension   • Status post laparoscopic sleeve gastrectomy   • Gastroesophageal reflux disease without esophagitis   • Diastolic dysfunction     ____________________________________________________________________________________________________________________________________________  Health maintenance and recommendations      Similar recommendations as last visit     Advised staying uptodate with immunizations per established standard guidelines.      Offered to give patient  a copy      Questions were encouraged, asked and answered to the patient's  understanding and satisfaction. Questions if any regarding current medications and side effects, need for refills and importance of compliance to medications stressed.    Reviewed available prior notes, consults, prior visits, laboratory findings, radiology and cardiology relevant reports. Updated chart as applicable. I have reviewed the patient's medical history in detail and updated the computerized patient record as relevant.      Updated patient regarding any new or relevant abnormalities on review of records or any new findings on physical exam. Mentioned to patient about purpose of visit and desirable health short and long term goals and objectives.    Primary to monitor CBC CMP Lipid panel and TSH as applicable    ___________________________________________________________________________________________________________________________________________           Plan      Could not get AKIN evaluation that was cancelled due to insurance not approving  Recommend evaluation for obstructive  sleep apnea given snoring and daytime somnolence and fatigue by primary provider    Hopefully insurance will see the benefit of this and approve it .     Recommend evaluation for obstructive sleep apnea given snoring and daytime somnolence and fatigue by primary provider  In addition has body habitus including oropharyngeal crowding increasing the likelihood of obstructive sleep apnea         Check BP and heart rates twice daily at least 3x / week at home and bring a recording for me to review next visit  If BP >140/90 or < 100/60 persistently over 3 reading 30 mins apart call sooner       Keep f/u Dr Sanchez     Orders Placed This Encounter   Procedures   • ECG 12 Lead     This order was created via procedure documentation   • Adult Transthoracic Echo Complete W/ Cont if Necessary Per Protocol     Standing Status:   Future     Standing Expiration Date:   6/15/2021     Order Specific Question:   Reason for exam?     Answer:   Dyspnea               Return in about 6 months (around 12/15/2020).

## 2020-07-06 ENCOUNTER — LAB (OUTPATIENT)
Dept: LAB | Facility: HOSPITAL | Age: 50
End: 2020-07-06

## 2020-07-06 ENCOUNTER — TRANSCRIBE ORDERS (OUTPATIENT)
Dept: ADMINISTRATIVE | Facility: HOSPITAL | Age: 50
End: 2020-07-06

## 2020-07-06 ENCOUNTER — HOSPITAL ENCOUNTER (OUTPATIENT)
Dept: CARDIOLOGY | Facility: HOSPITAL | Age: 50
Discharge: HOME | End: 2020-07-06
Admitting: INTERNAL MEDICINE

## 2020-07-06 VITALS
DIASTOLIC BLOOD PRESSURE: 75 MMHG | HEIGHT: 63 IN | WEIGHT: 235.89 LBS | BODY MASS INDEX: 41.8 KG/M2 | SYSTOLIC BLOOD PRESSURE: 127 MMHG

## 2020-07-06 DIAGNOSIS — E55.9 VITAMIN D DEFICIENCY DISEASE: ICD-10-CM

## 2020-07-06 DIAGNOSIS — R06.09 DOE (DYSPNEA ON EXERTION): ICD-10-CM

## 2020-07-06 DIAGNOSIS — I10 ESSENTIAL HYPERTENSION, MALIGNANT: Primary | ICD-10-CM

## 2020-07-06 DIAGNOSIS — I10 ESSENTIAL HYPERTENSION, MALIGNANT: ICD-10-CM

## 2020-07-06 LAB
25(OH)D3 SERPL-MCNC: 40.8 NG/ML (ref 30–100)
ALBUMIN SERPL-MCNC: 4.4 G/DL (ref 3.5–5.2)
ALBUMIN/GLOB SERPL: 1.5 G/DL
ALP SERPL-CCNC: 73 U/L (ref 39–117)
ALT SERPL W P-5'-P-CCNC: 151 U/L (ref 1–33)
ANION GAP SERPL CALCULATED.3IONS-SCNC: 13.1 MMOL/L (ref 5–15)
AST SERPL-CCNC: 111 U/L (ref 1–32)
BILIRUB SERPL-MCNC: 0.9 MG/DL (ref 0–1.2)
BUN SERPL-MCNC: 15 MG/DL (ref 6–20)
BUN/CREAT SERPL: 18.5 (ref 7–25)
CALCIUM SPEC-SCNC: 9.7 MG/DL (ref 8.6–10.5)
CHLORIDE SERPL-SCNC: 102 MMOL/L (ref 98–107)
CO2 SERPL-SCNC: 24.9 MMOL/L (ref 22–29)
CREAT SERPL-MCNC: 0.81 MG/DL (ref 0.57–1)
GFR SERPL CREATININE-BSD FRML MDRD: 75 ML/MIN/1.73
GLOBULIN UR ELPH-MCNC: 2.9 GM/DL
GLUCOSE SERPL-MCNC: 116 MG/DL (ref 65–99)
MAGNESIUM SERPL-MCNC: 2 MG/DL (ref 1.6–2.6)
PHOSPHATE SERPL-MCNC: 4 MG/DL (ref 2.5–4.5)
POTASSIUM SERPL-SCNC: 3.9 MMOL/L (ref 3.5–5.2)
PROT SERPL-MCNC: 7.3 G/DL (ref 6–8.5)
SODIUM SERPL-SCNC: 140 MMOL/L (ref 136–145)
URATE SERPL-MCNC: 9.7 MG/DL (ref 2.4–5.7)

## 2020-07-06 PROCEDURE — 84550 ASSAY OF BLOOD/URIC ACID: CPT | Performed by: INTERNAL MEDICINE

## 2020-07-06 PROCEDURE — 93306 TTE W/DOPPLER COMPLETE: CPT | Performed by: INTERNAL MEDICINE

## 2020-07-06 PROCEDURE — 84100 ASSAY OF PHOSPHORUS: CPT | Performed by: INTERNAL MEDICINE

## 2020-07-06 PROCEDURE — 36415 COLL VENOUS BLD VENIPUNCTURE: CPT

## 2020-07-06 PROCEDURE — 82306 VITAMIN D 25 HYDROXY: CPT | Performed by: INTERNAL MEDICINE

## 2020-07-06 PROCEDURE — 93306 TTE W/DOPPLER COMPLETE: CPT

## 2020-07-06 PROCEDURE — 80053 COMPREHEN METABOLIC PANEL: CPT | Performed by: INTERNAL MEDICINE

## 2020-07-06 PROCEDURE — 83735 ASSAY OF MAGNESIUM: CPT | Performed by: INTERNAL MEDICINE

## 2020-07-06 PROCEDURE — 25010000002 PERFLUTREN 6.52 MG/ML SUSPENSION: Performed by: INTERNAL MEDICINE

## 2020-07-06 RX ADMIN — PERFLUTREN 8.48 MG: 6.52 INJECTION, SUSPENSION INTRAVENOUS at 08:24

## 2020-07-08 LAB
BH CV ECHO MEAS - AO MAX PG (FULL): 2.2 MMHG
BH CV ECHO MEAS - AO MAX PG: 4.9 MMHG
BH CV ECHO MEAS - AO MEAN PG (FULL): 2 MMHG
BH CV ECHO MEAS - AO MEAN PG: 3 MMHG
BH CV ECHO MEAS - AO ROOT AREA (BSA CORRECTED): 1.2
BH CV ECHO MEAS - AO ROOT AREA: 4.5 CM^2
BH CV ECHO MEAS - AO ROOT DIAM: 2.4 CM
BH CV ECHO MEAS - AO V2 MAX: 111 CM/SEC
BH CV ECHO MEAS - AO V2 MEAN: 81.7 CM/SEC
BH CV ECHO MEAS - AO V2 VTI: 27.9 CM
BH CV ECHO MEAS - AVA(I,A): 1.9 CM^2
BH CV ECHO MEAS - AVA(I,D): 1.9 CM^2
BH CV ECHO MEAS - AVA(V,A): 2.1 CM^2
BH CV ECHO MEAS - AVA(V,D): 2.1 CM^2
BH CV ECHO MEAS - BSA(HAYCOCK): 2.2 M^2
BH CV ECHO MEAS - BSA: 2.1 M^2
BH CV ECHO MEAS - BZI_BMI: 41.8 KILOGRAMS/M^2
BH CV ECHO MEAS - BZI_METRIC_HEIGHT: 160 CM
BH CV ECHO MEAS - BZI_METRIC_WEIGHT: 107.1 KG
BH CV ECHO MEAS - EDV(CUBED): 139 ML
BH CV ECHO MEAS - EDV(MOD-SP4): 142 ML
BH CV ECHO MEAS - EDV(TEICH): 128.4 ML
BH CV ECHO MEAS - EF(CUBED): 89.9 %
BH CV ECHO MEAS - EF(MOD-SP4): 75.7 %
BH CV ECHO MEAS - EF(TEICH): 84.1 %
BH CV ECHO MEAS - ESV(CUBED): 14 ML
BH CV ECHO MEAS - ESV(MOD-SP4): 34.5 ML
BH CV ECHO MEAS - ESV(TEICH): 20.4 ML
BH CV ECHO MEAS - FS: 53.5 %
BH CV ECHO MEAS - IVS/LVPW: 0.96
BH CV ECHO MEAS - IVSD: 0.92 CM
BH CV ECHO MEAS - LV DIASTOLIC VOL/BSA (35-75): 68.4 ML/M^2
BH CV ECHO MEAS - LV MASS(C)D: 177.7 GRAMS
BH CV ECHO MEAS - LV MASS(C)DI: 85.7 GRAMS/M^2
BH CV ECHO MEAS - LV MAX PG: 2.7 MMHG
BH CV ECHO MEAS - LV MEAN PG: 1 MMHG
BH CV ECHO MEAS - LV SYSTOLIC VOL/BSA (12-30): 16.6 ML/M^2
BH CV ECHO MEAS - LV V1 MAX: 82 CM/SEC
BH CV ECHO MEAS - LV V1 MEAN: 53.9 CM/SEC
BH CV ECHO MEAS - LV V1 VTI: 18.3 CM
BH CV ECHO MEAS - LVIDD: 5.2 CM
BH CV ECHO MEAS - LVIDS: 2.4 CM
BH CV ECHO MEAS - LVLD AP4: 7.1 CM
BH CV ECHO MEAS - LVLS AP4: 5.6 CM
BH CV ECHO MEAS - LVOT AREA (M): 2.8 CM^2
BH CV ECHO MEAS - LVOT AREA: 2.8 CM^2
BH CV ECHO MEAS - LVOT DIAM: 1.9 CM
BH CV ECHO MEAS - LVPWD: 0.96 CM
BH CV ECHO MEAS - MED PEAK E' VEL: 8.16 CM/SEC
BH CV ECHO MEAS - MV A MAX VEL: 65.5 CM/SEC
BH CV ECHO MEAS - MV DEC SLOPE: 448 CM/SEC^2
BH CV ECHO MEAS - MV DEC TIME: 0.18 SEC
BH CV ECHO MEAS - MV E MAX VEL: 80.5 CM/SEC
BH CV ECHO MEAS - MV E/A: 1.2
BH CV ECHO MEAS - RAP SYSTOLE: 5 MMHG
BH CV ECHO MEAS - RVSP: 32.5 MMHG
BH CV ECHO MEAS - SI(AO): 60.8 ML/M^2
BH CV ECHO MEAS - SI(CUBED): 60.2 ML/M^2
BH CV ECHO MEAS - SI(LVOT): 25 ML/M^2
BH CV ECHO MEAS - SI(MOD-SP4): 51.8 ML/M^2
BH CV ECHO MEAS - SI(TEICH): 52 ML/M^2
BH CV ECHO MEAS - SV(AO): 126.2 ML
BH CV ECHO MEAS - SV(CUBED): 125 ML
BH CV ECHO MEAS - SV(LVOT): 51.9 ML
BH CV ECHO MEAS - SV(MOD-SP4): 107.5 ML
BH CV ECHO MEAS - SV(TEICH): 108 ML
BH CV ECHO MEAS - TR MAX VEL: 262 CM/SEC
LV EF 2D ECHO EST: 60 %
MAXIMAL PREDICTED HEART RATE: 171 BPM
STRESS TARGET HR: 145 BPM

## 2020-07-10 ENCOUNTER — TELEPHONE (OUTPATIENT)
Dept: CARDIOLOGY | Facility: CLINIC | Age: 50
End: 2020-07-10

## 2020-07-10 DIAGNOSIS — R06.09 DOE (DYSPNEA ON EXERTION): Primary | ICD-10-CM

## 2020-07-11 NOTE — TELEPHONE ENCOUNTER
Overall strong heart muscle  No significant valvular abnormalities  Suspected small pleural effusion on the left side  I have ordered a chest x-ray for her for this reason

## 2020-07-16 ENCOUNTER — HOSPITAL ENCOUNTER (OUTPATIENT)
Dept: GENERAL RADIOLOGY | Facility: HOSPITAL | Age: 50
Discharge: HOME | End: 2020-07-16
Admitting: INTERNAL MEDICINE

## 2020-07-16 DIAGNOSIS — R06.09 DOE (DYSPNEA ON EXERTION): ICD-10-CM

## 2020-07-16 PROCEDURE — 71046 X-RAY EXAM CHEST 2 VIEWS: CPT

## 2020-07-21 ENCOUNTER — TELEPHONE (OUTPATIENT)
Dept: CARDIOLOGY | Facility: CLINIC | Age: 50
End: 2020-07-21

## 2020-10-20 RX ORDER — CLONIDINE HYDROCHLORIDE 0.1 MG/1
0.1 TABLET ORAL 2 TIMES DAILY PRN
Qty: 180 TABLET | Refills: 4 | Status: SHIPPED | OUTPATIENT
Start: 2020-10-20 | End: 2021-12-29

## 2020-10-20 RX ORDER — CLONIDINE HYDROCHLORIDE 0.1 MG/1
0.1 TABLET ORAL 2 TIMES DAILY PRN
COMMUNITY
End: 2020-10-20 | Stop reason: SDUPTHER

## 2020-12-19 ENCOUNTER — TELEMEDICINE (OUTPATIENT)
Dept: CARDIOLOGY | Facility: CLINIC | Age: 50
End: 2020-12-19

## 2020-12-19 VITALS
HEART RATE: 64 BPM | HEIGHT: 63 IN | SYSTOLIC BLOOD PRESSURE: 119 MMHG | DIASTOLIC BLOOD PRESSURE: 74 MMHG | WEIGHT: 242 LBS | BODY MASS INDEX: 42.88 KG/M2

## 2020-12-19 DIAGNOSIS — E55.9 VITAMIN D DEFICIENCY: ICD-10-CM

## 2020-12-19 DIAGNOSIS — I10 ESSENTIAL HYPERTENSION: ICD-10-CM

## 2020-12-19 DIAGNOSIS — E79.0 HYPERURICEMIA: ICD-10-CM

## 2020-12-19 DIAGNOSIS — I51.89 DIASTOLIC DYSFUNCTION: Primary | ICD-10-CM

## 2020-12-19 DIAGNOSIS — E66.09 CLASS 2 OBESITY DUE TO EXCESS CALORIES WITHOUT SERIOUS COMORBIDITY WITH BODY MASS INDEX (BMI) OF 37.0 TO 37.9 IN ADULT: ICD-10-CM

## 2020-12-19 DIAGNOSIS — Z98.84 STATUS POST LAPAROSCOPIC SLEEVE GASTRECTOMY: ICD-10-CM

## 2020-12-19 DIAGNOSIS — K21.9 GASTROESOPHAGEAL REFLUX DISEASE WITHOUT ESOPHAGITIS: ICD-10-CM

## 2020-12-19 PROCEDURE — 99213 OFFICE O/P EST LOW 20 MIN: CPT | Performed by: INTERNAL MEDICINE

## 2020-12-19 NOTE — PROGRESS NOTES
Alona Owens  [id number]  1970  50 y.o.  female      You have chosen to receive care through a telehealth visit.  Do you consent to use a video/audio connection for your medical care today? Yes    Routine virtual visit using above modality     Referring Provider: Ludwig Hayes MD    Reason for Follow-up Visit: Here for routine follow up   Essential Hypertension  S/P gastric bypass  Has gained weight back   Reviewed echo images from [place] and shows borderline LVH and diastolic dysfunction     Subjective      Overall feels better  Blood pressures much better controlled  Was noted to have elevated uric acid  Was started on allopurinol  Uric acid levels have subsequently come down  Denies any chest pain  No palpitation  No presyncope  No syncope  No orthopnea  No paroxysmal nocturnal dyspnea  Fairly active  In the interim had obstructive sleep apnea study and [place] and this was negative  Due to see Dr. Myrna Suarez gastroenterologist in [facility] for elevated liver enzymes      History of present illness:  Alona Owens is a 50 y.o. yo female with history of Essential Hypertension    who presents today for   Chief Complaint   Patient presents with   • Follow-up   .    History  Past Medical History:   Diagnosis Date   • Fatty liver    • Granuloma of skin determined by biopsy     Pyrogenic on left arch removed by Dr Freddy Mao on    • Heartburn     mild-she uses PPI's as needed     • Hyperlipidemia    • Hypertension    • Migraines     Occasionally    • Obesity    • PONV (postoperative nausea and vomiting)    ,   Past Surgical History:   Procedure Laterality Date   • ABDOMINOPLASTY     • BREAST IMPLANT SURGERY     •  SECTION     • CHOLECYSTECTOMY     • ENDOMETRIAL ABLATION     • GASTRIC SLEEVE LAPAROSCOPIC N/A 2017    Procedure: GASTRIC SLEEVE LAPAROSCOPIC, HIATAL HERNIA REPAIR;  Surgeon: Andrews Sanchez MD;  Location: [facility];  Service:    • UPPER  GASTROINTESTINAL ENDOSCOPY  07/12/2016    Dr Castaneda at [facility] [place]    ,   Family History   Problem Relation Age of Onset   • Obesity Mother    • Hypertension Mother    • No Known Problems Father    ,   Social History     Tobacco Use   • Smoking status: Never Smoker   • Smokeless tobacco: Never Used   Substance Use Topics   • Alcohol use: No     Alcohol/week: 1.0 standard drinks     Types: 1 Glasses of wine per week   • Drug use: No   ,     Medications  Current Outpatient Medications   Medication Sig Dispense Refill   • ALPRAZolam (XANAX) 0.25 MG tablet Take 0.25 mg by mouth Daily.     • BIOTIN PO Take  by mouth.     • Calcium-Phosphorus-Vitamin D (CITRACAL +D3 PO) Take  by mouth.     • cloNIDine (CATAPRES) 0.1 MG tablet Take 1 tablet by mouth 2 (Two) Times a Day As Needed (For BP greater than 140/90). 180 tablet 4   • ergocalciferol (ERGOCALCIFEROL) 90909 units capsule Take 1 capsule by mouth 1 (One) Time Per Week. 4 capsule 3   • escitalopram (LEXAPRO) 10 MG tablet Take 10 mg by mouth Daily.     • escitalopram (LEXAPRO) 5 MG tablet Take 5 mg by mouth Daily.     • furosemide (LASIX) 20 MG tablet Take 20 mg by mouth 2 (Two) Times a Day.     • furosemide (LASIX) 20 MG tablet Take 20 mg by mouth 2 (Two) Times a Day.     • lisinopril (PRINIVIL,ZESTRIL) 40 MG tablet Take 40 mg by mouth 2 (Two) Times a Day.     • Multiple Vitamins-Minerals (CENTRUM ADULTS PO) Take  by mouth.     • nebivolol (BYSTOLIC) 10 MG tablet Take 10 mg by mouth Daily.     • omeprazole (PriLOSEC) 20 MG capsule Take 20 mg by mouth daily.     • spironolactone (ALDACTONE) 50 MG tablet Take 50 mg by mouth Daily.     • vitamin B-12 (CYANOCOBALAMIN) 500 MCG tablet Take  by mouth 1 (One) Time Per Week.       No current facility-administered medications for this visit.        Allergies:  Patient has no known allergies.    Review of Systems  Review of Systems   Constitution: Negative for malaise/fatigue.   HENT: Negative.    Eyes: Negative.   "  Cardiovascular: Positive for leg swelling. Negative for chest pain, claudication, cyanosis, dyspnea on exertion, irregular heartbeat, near-syncope, orthopnea, palpitations, paroxysmal nocturnal dyspnea and syncope.   Respiratory: Negative.    Endocrine: Negative.    Hematologic/Lymphatic: Negative.    Skin: Negative.    Gastrointestinal: Negative for anorexia.   Genitourinary: Negative.    Neurological: Positive for weakness.   Psychiatric/Behavioral: Negative.        Objective     Physical Exam:  /74   Pulse 64   Ht 160 cm (63\")   Wt 110 kg (242 lb)   BMI 42.87 kg/m²       Self reported vitals above as available      • General appearance is normal  • Normal judgment and insight  • Normal assessment of mental status, which may include:  - Orientation to time, place, and person  - Recent and remote memory  - Mood and affect  • Normal external appearance of the ears and nose  • Normal assessment of hearing  • Normal external appearance of lips  • Normal external appearance of anterior surface of neck  • Normal respiratory effort  • No reported lower extremity edema  • No obvious skin abnormalities of visualized skin surfaces    • Normal examination of digits and nails (clubbing, cyanosis ,ischemia)  • No obvious abnormalities of visualized joint or musculature  • Normalized range of motion of visualized muscle groups       '  Results Review:      Results for orders placed during the hospital encounter of 07/06/20   Adult Transthoracic Echo Complete W/ Cont if Necessary Per Protocol    Narrative · Estimated EF = 60%.  · Left ventricular systolic function is normal.  · Left ventricular diastolic function is normal.  · No evidence of pulmonary hypertension is present.  · Possible left sided small pleural effusion         Findings :     The heart and mediastinum are normal in size. Lungs are without focal  infiltrate, mass or effusions.  The bones show no acute pathology.       IMPRESSION:  Impression:     No " acute cardiopulmonary disease.     This report was finalized on 07/16/2020 12:25 by Dr. Heaven Brito MD.    ____________________________________________________________________________________________________________________________________________  Health maintenance and recommendations  Similar recommendations as last visit     Offered to give patient  a copy      Questions were encouraged, asked and answered to the patient's  understanding and satisfaction. Questions if any regarding current medications and side effects, need for refills and importance of compliance to medications stressed.    Reviewed available prior notes, consults, prior visits, laboratory findings, radiology and cardiology relevant reports. Updated chart as applicable. I have reviewed the patient's medical history in detail and updated the computerized patient record as relevant.      Updated patient regarding any new or relevant abnormalities on review of records or any new findings on physical exam. Mentioned to patient about purpose of visit and desirable health short and long term goals and objectives.    Primary to monitor CBC CMP Lipid panel and TSH as applicable    ___________________________________________________________________________________________________________________________________________         Procedures    Assessment/Plan   Patient Active Problem List   Diagnosis   • Class 2 obesity in adult   • Vitamin D deficiency   • Essential hypertension   • Status post laparoscopic sleeve gastrectomy   • Gastroesophageal reflux disease without esophagitis   • Diastolic dysfunction       Plan      Advised patient to see if she can wean herself off clonidine  Check BP and heart rates twice daily at least 3x / week at home and bring a recording for me to review next visit  If BP >140/90 or < 100/60 persistently over 3 reading 30 mins apart call sooner     She is going to see Dr. Myrna Suarez gastroenterologist from [facility]  [place] next month for elevated liver enzymes  If Dr. Suarez wants can discontinue lisinopril which can cause liver enzyme elevation    Also recommended that she sees Dr. Sanchez from bariatric surgery in follow-up  Advised her to call Dr. Sanchez office for in person or virtual visit  No additional cardiac testing currently required    Advised her to consider COVID-19 vaccine due to her increased risk of complications due to multiple comorbidities  Ongoing risk factor modifications    Total time spent this video visit 18 minutes      Return in about 6 months (around 6/19/2021).

## 2021-12-29 RX ORDER — CLONIDINE HYDROCHLORIDE 0.1 MG/1
TABLET ORAL
Qty: 180 TABLET | Refills: 4 | Status: SHIPPED | OUTPATIENT
Start: 2021-12-29

## 2024-06-20 ENCOUNTER — TELEPHONE (OUTPATIENT)
Dept: VASCULAR SURGERY | Facility: CLINIC | Age: 54
End: 2024-06-20
Payer: COMMERCIAL

## 2024-06-21 ENCOUNTER — OFFICE VISIT (OUTPATIENT)
Dept: VASCULAR SURGERY | Facility: CLINIC | Age: 54
End: 2024-06-21
Payer: COMMERCIAL

## 2024-06-21 VITALS
HEART RATE: 78 BPM | DIASTOLIC BLOOD PRESSURE: 62 MMHG | HEIGHT: 63 IN | OXYGEN SATURATION: 98 % | SYSTOLIC BLOOD PRESSURE: 112 MMHG | WEIGHT: 146 LBS | BODY MASS INDEX: 25.87 KG/M2

## 2024-06-21 DIAGNOSIS — R60.0 BILATERAL LEG EDEMA: Primary | ICD-10-CM

## 2024-06-21 RX ORDER — LEVOTHYROXINE SODIUM 0.05 MG/1
50 TABLET ORAL DAILY
COMMUNITY
Start: 2024-05-21

## 2024-06-21 RX ORDER — CHLORTHALIDONE 50 MG/1
50 TABLET ORAL DAILY
COMMUNITY

## 2024-06-21 RX ORDER — ONDANSETRON 4 MG/1
4 TABLET, ORALLY DISINTEGRATING ORAL EVERY 8 HOURS PRN
COMMUNITY
Start: 2024-02-29

## 2024-06-21 RX ORDER — FUROSEMIDE 40 MG/1
40 TABLET ORAL 2 TIMES DAILY
COMMUNITY

## 2024-06-21 RX ORDER — SEMAGLUTIDE 2.68 MG/ML
2 INJECTION, SOLUTION SUBCUTANEOUS WEEKLY
COMMUNITY
Start: 2024-05-13

## 2024-06-21 NOTE — PROGRESS NOTES
06/21/2024      Anu Hayes PA  [address] [place] [address]    Alona Owens  1970    Chief Complaint   Patient presents with    Chronic Venous Insufficiency     Has leg pain and heaviness and no varicose veins. Does have edema, that is persistent, has tried compression.       Dear EMMA Nicholson:      HPI  I had the pleasure of seeing your patient Alona Owens in the office today.  Thank you kindly for this consultation.  As you recall, Alona Owens is a 54 y.o.  female who you are currently following for routine health maintenance.  She is here today for second opinion.  She was started on amlodipine years ago and began to have swelling in her ankles and feet, her primary care took her off of the amlodipine and since then the swelling has continued.  She has been worked up for lymphedema, she has used compression for 6 to 8 months along with home lymphedema pumps, with no relief.  She recently was worked up with venous ultrasound, and venogram of her bilateral lower extremities and the doctor wanted to perform an ablation due to venous insufficiency, but she wanted a second opinion.  We did not receive any testing and she did not bring any testing.  She just really wants to know if she should go forward with the procedure with her current surgeon, not really wanting us to perform any intervention.  She did drive 45 minutes to come here.  She is maintained on Lasix, which she states if she does not take she can tell a difference.    Review of Systems   Constitutional: Negative.  Negative for diaphoresis and fever.   HENT: Negative.     Eyes: Negative.    Respiratory: Negative.  Negative for shortness of breath and wheezing.    Cardiovascular:  Positive for leg swelling.        Bilateral lower extremity heaviness and discomfort   Gastrointestinal: Negative.  Negative for abdominal pain.   Endocrine: Negative.    Genitourinary: Negative.    Musculoskeletal: Negative.  " Negative for gait problem.   Skin: Negative.    Allergic/Immunologic: Negative.    Neurological: Negative.  Negative for dizziness and numbness.   Hematological: Negative.    Psychiatric/Behavioral: Negative.       /62   Pulse 78   Ht 160 cm (63\")   Wt 66.2 kg (146 lb)   SpO2 98%   BMI 25.86 kg/m²     Physical Exam  Vitals and nursing note reviewed.   Constitutional:       General: She is not in acute distress.     Appearance: Normal appearance. She is not diaphoretic.   HENT:      Head: Normocephalic. No right periorbital erythema or left periorbital erythema.      Nose: Nose normal.   Eyes:      General: No scleral icterus.     Pupils: Pupils are equal.   Cardiovascular:      Rate and Rhythm: Normal rate and regular rhythm.      Pulses: Normal pulses.           Femoral pulses are 2+ on the right side and 2+ on the left side.       Popliteal pulses are 2+ on the right side and 2+ on the left side.        Dorsalis pedis pulses are 2+ on the right side and 2+ on the left side.        Posterior tibial pulses are 2+ on the right side and 2+ on the left side.      Heart sounds: Normal heart sounds. No murmur heard.  Pulmonary:      Effort: Pulmonary effort is normal. No respiratory distress.      Breath sounds: Normal breath sounds.   Abdominal:      General: Bowel sounds are normal. There is no distension.      Palpations: Abdomen is soft.      Tenderness: There is no abdominal tenderness. There is no guarding.   Musculoskeletal:         General: No swelling or tenderness. Normal range of motion.      Cervical back: Normal range of motion and neck supple.      Right lower le+ Edema present.      Left lower le+ Edema present.   Feet:      Right foot:      Skin integrity: Skin integrity normal.      Left foot:      Skin integrity: Skin integrity normal.   Skin:     General: Skin is warm and dry.      Findings: No erythema or rash.   Neurological:      General: No focal deficit present.      Mental " Status: She is alert and oriented to person, place, and time. Mental status is at baseline.      Cranial Nerves: No cranial nerve deficit.      Gait: Gait normal.   Psychiatric:         Attention and Perception: Attention normal.         Mood and Affect: Mood normal.         No results found.    Patient Active Problem List   Diagnosis    Class 2 obesity in adult    Vitamin D deficiency    Essential hypertension    Status post laparoscopic sleeve gastrectomy    Gastroesophageal reflux disease without esophagitis    Diastolic dysfunction    Hyperuricemia    Bilateral leg edema         ICD-10-CM ICD-9-CM   1. Bilateral leg edema  R60.0 782.3           Plan: Alona Owens unfortunately drove all this way without testing for us to review.  She ideally just wanted a second opinion as to whether or not she should go through with the ablation procedure.  She did sit in the lobby for as long as she could waiting for the test reports to be faxed so we could review them together, but she had to leave.  I did review her testing which shows reflux to her GSV in her left mid calf, she has no reflux in her right lower extremity.  This may help with the heaviness and discomfort she feels in her left leg, but she has swelling to both legs and I do not believe that this will help her swelling.  If her symptoms worsen, she begins to develop ropelike varicose veins, she can follow-up with us.  The patient can continue taking their current medication regimen as previously planned.  This was all discussed in full with complete understanding.    Thank you for allowing me to participate in the care of your patient.  Please do not hesitate with any questions or concerns.  I will keep you aware of any further encounters with Alona Owens.        Sincerely yours,         DELON Duran

## (undated) DEVICE — VISIGI 3D®  CALIBRATION SYSTEM  SIZE 40FR STD W/ BULB: Brand: BOEHRINGER® VISIGI 3D™ SLEEVE GASTRECTOMY CALIBRATION SYSTEM, SIZE 40FR W/BULB

## (undated) DEVICE — GLV SURG TRIUMPH GREEN W/ALOE PF LTX 8.5 STRL

## (undated) DEVICE — GOWN,NON-REINFORCED,SIRUS,SET IN SLV,XL: Brand: MEDLINE

## (undated) DEVICE — ECHELON FLEX POWERED PLUS LONG ARTICULATING ENDOSCOPIC LINEAR CUTTER, 60MM: Brand: ECHELON FLEX

## (undated) DEVICE — SUT VIC 0 SUTUPAK TIES 18IN J906G

## (undated) DEVICE — MINI ENDOCUT SCISSOR TIP, DISPOSABLE: Brand: RENEW

## (undated) DEVICE — GOWN,NON-REINFORCED,SIRUS,SET IN SLV,XXL: Brand: MEDLINE

## (undated) DEVICE — SUT MNCRYL 4/0 RB1 27IN UD MCP214H

## (undated) DEVICE — HARMONIC ACE +7 LAPAROSCOPIC SHEARS ADVANCED HEMOSTASIS 5MM DIAMETER 45CM SHAFT LENGTH  FOR USE WITH GRAY HAND PIECE ONLY: Brand: HARMONIC ACE

## (undated) DEVICE — GLV SURG TRIUMPH ORTHO W/ALOE PF LTX 7.0

## (undated) DEVICE — PK TURNOVER RM ADV

## (undated) DEVICE — APPL CHLORAPREP W/TINT 26ML ORNG

## (undated) DEVICE — MAT LIFTAEM MBL TRANSFR SYS 35X80IN

## (undated) DEVICE — TOWEL,OR,DSP,ST,BLUE,DLX,10/PK,8PK/CS: Brand: MEDLINE

## (undated) DEVICE — SYS CLOSE PORTII CARTR/THOMASN XL

## (undated) DEVICE — NDL HYPO PRECISIONGLIDE REG 22G 1 1/2

## (undated) DEVICE — ENDOPATH XCEL BLADELESS TROCARS WITH STABILITY SLEEVES: Brand: ENDOPATH XCEL

## (undated) DEVICE — VISUALIZATION SYSTEM: Brand: CLEARIFY

## (undated) DEVICE — PAD LAP CHOLE: Brand: MEDLINE INDUSTRIES, INC.

## (undated) DEVICE — MARKR SKIN W/RULR ULTRAFINETP

## (undated) DEVICE — 2, DISPOSABLE SUCTION/IRRIGATOR WITHOUT DISPOSABLE TIP: Brand: STRYKEFLOW

## (undated) DEVICE — ENDOPATH XCEL WITH OPTIVIEW TECHNOLOGY UNIVERSAL TROCAR STABILITY SLEEVES: Brand: ENDOPATH XCEL OPTIVIEW

## (undated) DEVICE — GLV SURG BIOGEL LTX PF 8

## (undated) DEVICE — SKIN AFFIX SURG ADHESIVE 72/CS 0.55ML: Brand: MEDLINE

## (undated) DEVICE — ENDOPATH PNEUMONEEDLE INSUFFLATION NEEDLES WITH LUER LOCK CONNECTORS 120MM: Brand: ENDOPATH

## (undated) DEVICE — ENDOPATH XCEL WITH OPTIVIEW TECHNOLOGY BLADELESS TROCARS WITH STABILITY SLEEVES: Brand: ENDOPATH XCEL OPTIVIEW

## (undated) DEVICE — SYR LL TP 10ML STRL

## (undated) DEVICE — BNDR ABD 4PANEL 12IN 63 TO 74IN

## (undated) DEVICE — BANDAGE,GAUZE,BULKEE II,4.5"X4.1YD,STRL: Brand: MEDLINE

## (undated) DEVICE — FLTR PLUMEPORT LAP W/CONN STRL